# Patient Record
Sex: FEMALE | Race: WHITE | Employment: UNEMPLOYED | URBAN - METROPOLITAN AREA
[De-identification: names, ages, dates, MRNs, and addresses within clinical notes are randomized per-mention and may not be internally consistent; named-entity substitution may affect disease eponyms.]

---

## 2017-06-27 ENCOUNTER — APPOINTMENT (OUTPATIENT)
Dept: GENERAL RADIOLOGY | Age: 57
DRG: 189 | End: 2017-06-27
Attending: EMERGENCY MEDICINE
Payer: MEDICARE

## 2017-06-27 ENCOUNTER — HOSPITAL ENCOUNTER (INPATIENT)
Age: 57
LOS: 2 days | Discharge: HOME | DRG: 189 | End: 2017-06-29
Attending: EMERGENCY MEDICINE | Admitting: INTERNAL MEDICINE
Payer: MEDICARE

## 2017-06-27 DIAGNOSIS — J44.9 CHRONIC OBSTRUCTIVE PULMONARY DISEASE, UNSPECIFIED COPD TYPE (HCC): Primary | ICD-10-CM

## 2017-06-27 PROBLEM — J96.21 ACUTE ON CHRONIC RESPIRATORY FAILURE WITH HYPOXEMIA (HCC): Status: ACTIVE | Noted: 2017-06-27

## 2017-06-27 PROBLEM — E11.9 TYPE II DIABETES MELLITUS (HCC): Status: ACTIVE | Noted: 2017-06-27

## 2017-06-27 PROBLEM — N17.9 AKI (ACUTE KIDNEY INJURY) (HCC): Status: ACTIVE | Noted: 2017-06-27

## 2017-06-27 PROBLEM — I50.9 CHF (CONGESTIVE HEART FAILURE) (HCC): Status: ACTIVE | Noted: 2017-06-27

## 2017-06-27 LAB
ALBUMIN SERPL BCP-MCNC: 3.3 G/DL (ref 3.5–5)
ALBUMIN/GLOB SERPL: 0.8 {RATIO} (ref 1.1–2.2)
ALP SERPL-CCNC: 92 U/L (ref 45–117)
ALT SERPL-CCNC: 22 U/L (ref 12–78)
ANION GAP BLD CALC-SCNC: 5 MMOL/L (ref 5–15)
APTT PPP: 23.6 SEC (ref 22.1–32.5)
ARTERIAL PATENCY WRIST A: ABNORMAL
AST SERPL W P-5'-P-CCNC: 17 U/L (ref 15–37)
BASE EXCESS BLDA CALC-SCNC: 3.1 MMOL/L
BASOPHILS # BLD AUTO: 0 K/UL (ref 0–0.1)
BASOPHILS # BLD: 0 % (ref 0–1)
BDY SITE: ABNORMAL
BILIRUB SERPL-MCNC: 0.3 MG/DL (ref 0.2–1)
BNP SERPL-MCNC: 313 PG/ML (ref 0–125)
BUN SERPL-MCNC: 29 MG/DL (ref 6–20)
BUN/CREAT SERPL: 14 (ref 12–20)
CALCIUM SERPL-MCNC: 8.6 MG/DL (ref 8.5–10.1)
CHLORIDE SERPL-SCNC: 101 MMOL/L (ref 97–108)
CK SERPL-CCNC: 63 U/L (ref 26–192)
CO2 SERPL-SCNC: 33 MMOL/L (ref 21–32)
CREAT SERPL-MCNC: 2.09 MG/DL (ref 0.55–1.02)
D DIMER PPP FEU-MCNC: 0.85 MG/L FEU (ref 0–0.65)
EOSINOPHIL # BLD: 0.3 K/UL (ref 0–0.4)
EOSINOPHIL NFR BLD: 2 % (ref 0–7)
ERYTHROCYTE [DISTWIDTH] IN BLOOD BY AUTOMATED COUNT: 14.3 % (ref 11.5–14.5)
GAS FLOW.O2 O2 DELIVERY SYS: 4 L/MIN
GLOBULIN SER CALC-MCNC: 4.4 G/DL (ref 2–4)
GLUCOSE SERPL-MCNC: 184 MG/DL (ref 65–100)
HCO3 BLDA-SCNC: 29 MMOL/L (ref 22–26)
HCT VFR BLD AUTO: 31.3 % (ref 35–47)
HGB BLD-MCNC: 10.2 G/DL (ref 11.5–16)
INR PPP: 1 (ref 0.9–1.1)
LYMPHOCYTES # BLD AUTO: 18 % (ref 12–49)
LYMPHOCYTES # BLD: 2.3 K/UL (ref 0.8–3.5)
MCH RBC QN AUTO: 31.8 PG (ref 26–34)
MCHC RBC AUTO-ENTMCNC: 32.6 G/DL (ref 30–36.5)
MCV RBC AUTO: 97.5 FL (ref 80–99)
MONOCYTES # BLD: 1.1 K/UL (ref 0–1)
MONOCYTES NFR BLD AUTO: 9 % (ref 5–13)
NEUTS SEG # BLD: 9.4 K/UL (ref 1.8–8)
NEUTS SEG NFR BLD AUTO: 71 % (ref 32–75)
PCO2 BLDA: 49 MMHG (ref 35–45)
PH BLDA: 7.39 [PH] (ref 7.35–7.45)
PLATELET # BLD AUTO: 238 K/UL (ref 150–400)
PO2 BLDA: 81 MMHG (ref 80–100)
POTASSIUM SERPL-SCNC: 4 MMOL/L (ref 3.5–5.1)
PROT SERPL-MCNC: 7.7 G/DL (ref 6.4–8.2)
PROTHROMBIN TIME: 10.3 SEC (ref 9–11.1)
RBC # BLD AUTO: 3.21 M/UL (ref 3.8–5.2)
SAO2 % BLD: 96 % (ref 92–97)
SAO2% DEVICE SAO2% SENSOR NAME: ABNORMAL
SERVICE CMNT-IMP: ABNORMAL
SODIUM SERPL-SCNC: 139 MMOL/L (ref 136–145)
SPECIMEN SITE: ABNORMAL
THERAPEUTIC RANGE,PTTT: NORMAL SECS (ref 58–77)
TROPONIN I SERPL-MCNC: <0.04 NG/ML
WBC # BLD AUTO: 13.1 K/UL (ref 3.6–11)

## 2017-06-27 PROCEDURE — 85025 COMPLETE CBC W/AUTO DIFF WBC: CPT | Performed by: EMERGENCY MEDICINE

## 2017-06-27 PROCEDURE — 74011250636 HC RX REV CODE- 250/636: Performed by: EMERGENCY MEDICINE

## 2017-06-27 PROCEDURE — 94762 N-INVAS EAR/PLS OXIMTRY CONT: CPT

## 2017-06-27 PROCEDURE — 71010 XR CHEST PORT: CPT

## 2017-06-27 PROCEDURE — 80053 COMPREHEN METABOLIC PANEL: CPT | Performed by: EMERGENCY MEDICINE

## 2017-06-27 PROCEDURE — 99285 EMERGENCY DEPT VISIT HI MDM: CPT

## 2017-06-27 PROCEDURE — 82550 ASSAY OF CK (CPK): CPT | Performed by: EMERGENCY MEDICINE

## 2017-06-27 PROCEDURE — 82803 BLOOD GASES ANY COMBINATION: CPT | Performed by: EMERGENCY MEDICINE

## 2017-06-27 PROCEDURE — 65660000000 HC RM CCU STEPDOWN

## 2017-06-27 PROCEDURE — 36415 COLL VENOUS BLD VENIPUNCTURE: CPT | Performed by: EMERGENCY MEDICINE

## 2017-06-27 PROCEDURE — 77030013140 HC MSK NEB VYRM -A

## 2017-06-27 PROCEDURE — 36600 WITHDRAWAL OF ARTERIAL BLOOD: CPT | Performed by: EMERGENCY MEDICINE

## 2017-06-27 PROCEDURE — 85379 FIBRIN DEGRADATION QUANT: CPT | Performed by: EMERGENCY MEDICINE

## 2017-06-27 PROCEDURE — 96374 THER/PROPH/DIAG INJ IV PUSH: CPT

## 2017-06-27 PROCEDURE — 84484 ASSAY OF TROPONIN QUANT: CPT | Performed by: EMERGENCY MEDICINE

## 2017-06-27 PROCEDURE — 85610 PROTHROMBIN TIME: CPT | Performed by: EMERGENCY MEDICINE

## 2017-06-27 PROCEDURE — 85730 THROMBOPLASTIN TIME PARTIAL: CPT | Performed by: EMERGENCY MEDICINE

## 2017-06-27 PROCEDURE — 93005 ELECTROCARDIOGRAM TRACING: CPT

## 2017-06-27 PROCEDURE — 74011000250 HC RX REV CODE- 250: Performed by: EMERGENCY MEDICINE

## 2017-06-27 PROCEDURE — 83880 ASSAY OF NATRIURETIC PEPTIDE: CPT | Performed by: EMERGENCY MEDICINE

## 2017-06-27 RX ORDER — NALOXONE HYDROCHLORIDE 0.4 MG/ML
0.4 INJECTION, SOLUTION INTRAMUSCULAR; INTRAVENOUS; SUBCUTANEOUS AS NEEDED
Status: DISCONTINUED | OUTPATIENT
Start: 2017-06-27 | End: 2017-06-29 | Stop reason: HOSPADM

## 2017-06-27 RX ORDER — METOPROLOL TARTRATE 25 MG/1
12.5 TABLET, FILM COATED ORAL 2 TIMES DAILY
COMMUNITY

## 2017-06-27 RX ORDER — SODIUM CHLORIDE 0.9 % (FLUSH) 0.9 %
5-10 SYRINGE (ML) INJECTION EVERY 8 HOURS
Status: DISCONTINUED | OUTPATIENT
Start: 2017-06-27 | End: 2017-06-29 | Stop reason: HOSPADM

## 2017-06-27 RX ORDER — BUMETANIDE 2 MG/1
4 TABLET ORAL 2 TIMES DAILY
COMMUNITY
End: 2017-06-29

## 2017-06-27 RX ORDER — PRAMIPEXOLE DIHYDROCHLORIDE 0.5 MG/1
0.5 TABLET ORAL
COMMUNITY

## 2017-06-27 RX ORDER — HYDROCHLOROTHIAZIDE 12.5 MG/1
6.25 TABLET ORAL DAILY
COMMUNITY
End: 2017-06-27

## 2017-06-27 RX ORDER — METOPROLOL TARTRATE 50 MG/1
50 TABLET ORAL 2 TIMES DAILY
COMMUNITY
End: 2017-06-27

## 2017-06-27 RX ORDER — PANTOPRAZOLE SODIUM 40 MG/1
40 TABLET, DELAYED RELEASE ORAL
COMMUNITY

## 2017-06-27 RX ORDER — RANOLAZINE 500 MG/1
1000 TABLET, EXTENDED RELEASE ORAL 2 TIMES DAILY
COMMUNITY

## 2017-06-27 RX ORDER — METOLAZONE 2.5 MG/1
2.5 TABLET ORAL
COMMUNITY
End: 2017-06-27

## 2017-06-27 RX ORDER — CLOPIDOGREL BISULFATE 75 MG/1
75 TABLET ORAL
COMMUNITY

## 2017-06-27 RX ORDER — IPRATROPIUM BROMIDE AND ALBUTEROL SULFATE 2.5; .5 MG/3ML; MG/3ML
3 SOLUTION RESPIRATORY (INHALATION)
Status: DISCONTINUED | OUTPATIENT
Start: 2017-06-28 | End: 2017-06-28

## 2017-06-27 RX ORDER — ROPINIROLE 0.25 MG/1
0.25 TABLET, FILM COATED ORAL
COMMUNITY

## 2017-06-27 RX ORDER — IPRATROPIUM BROMIDE AND ALBUTEROL SULFATE 2.5; .5 MG/3ML; MG/3ML
3 SOLUTION RESPIRATORY (INHALATION)
Status: COMPLETED | OUTPATIENT
Start: 2017-06-27 | End: 2017-06-27

## 2017-06-27 RX ORDER — CEPHALEXIN 250 MG/1
500 CAPSULE ORAL EVERY 6 HOURS
Status: DISCONTINUED | OUTPATIENT
Start: 2017-06-28 | End: 2017-06-28

## 2017-06-27 RX ORDER — INDOMETHACIN 25 MG/1
25 CAPSULE ORAL DAILY
COMMUNITY
End: 2017-06-27

## 2017-06-27 RX ORDER — SODIUM CHLORIDE 0.9 % (FLUSH) 0.9 %
5-10 SYRINGE (ML) INJECTION AS NEEDED
Status: DISCONTINUED | OUTPATIENT
Start: 2017-06-27 | End: 2017-06-29 | Stop reason: HOSPADM

## 2017-06-27 RX ORDER — OXYBUTYNIN CHLORIDE 10 MG/1
10 TABLET, EXTENDED RELEASE ORAL
COMMUNITY

## 2017-06-27 RX ORDER — TRAMADOL HYDROCHLORIDE 50 MG/1
100 TABLET ORAL
COMMUNITY

## 2017-06-27 RX ORDER — BACLOFEN 10 MG/1
10 TABLET ORAL 2 TIMES DAILY
COMMUNITY

## 2017-06-27 RX ORDER — DULOXETIN HYDROCHLORIDE 60 MG/1
60 CAPSULE, DELAYED RELEASE ORAL
COMMUNITY

## 2017-06-27 RX ORDER — BUSPIRONE HYDROCHLORIDE 5 MG/1
5 TABLET ORAL
COMMUNITY

## 2017-06-27 RX ORDER — DOCUSATE SODIUM 100 MG/1
100 CAPSULE, LIQUID FILLED ORAL 2 TIMES DAILY
COMMUNITY

## 2017-06-27 RX ORDER — CHOLECALCIFEROL (VITAMIN D3) 125 MCG
10 CAPSULE ORAL
COMMUNITY

## 2017-06-27 RX ORDER — MELATONIN
1000 DAILY
COMMUNITY

## 2017-06-27 RX ORDER — PRAVASTATIN SODIUM 40 MG/1
40 TABLET ORAL
COMMUNITY

## 2017-06-27 RX ORDER — GABAPENTIN 300 MG/1
600 CAPSULE ORAL
COMMUNITY

## 2017-06-27 RX ORDER — LANOLIN ALCOHOL/MO/W.PET/CERES
325 CREAM (GRAM) TOPICAL
COMMUNITY

## 2017-06-27 RX ORDER — POTASSIUM CHLORIDE 20 MEQ/1
20 TABLET, EXTENDED RELEASE ORAL DAILY
COMMUNITY

## 2017-06-27 RX ORDER — SPIRONOLACTONE 50 MG/1
50 TABLET, FILM COATED ORAL
COMMUNITY

## 2017-06-27 RX ADMIN — METHYLPREDNISOLONE SODIUM SUCCINATE 125 MG: 125 INJECTION, POWDER, FOR SOLUTION INTRAMUSCULAR; INTRAVENOUS at 22:00

## 2017-06-27 RX ADMIN — IPRATROPIUM BROMIDE AND ALBUTEROL SULFATE 3 ML: .5; 3 SOLUTION RESPIRATORY (INHALATION) at 02:20

## 2017-06-27 NOTE — IP AVS SNAPSHOT
Current Discharge Medication List  
  
START taking these medications Dose & Instructions Dispensing Information Comments Morning Noon Evening Bedtime  
 cefdinir 300 mg capsule Commonly known as:  OMNICEF Your last dose was: Your next dose is:    
   
   
 Dose:  300 mg Take 1 Cap by mouth two (2) times a day for 5 days. Quantity:  10 Cap Refills:  0  
     
   
   
   
  
 doxycycline 100 mg tablet Commonly known as:  VIBRA-TABS Your last dose was: Your next dose is:    
   
   
 Dose:  100 mg Take 1 Tab by mouth every twelve (12) hours for 5 days. Quantity:  10 Tab Refills:  0  
     
   
   
   
  
 L. acidoph & paracasei- S therm- Bifido 8 billion cell Cap cap Commonly known as:  ROE-Q/RISAQUAD Your last dose was: Your next dose is:    
   
   
 Dose:  1 Cap Take 1 Cap by mouth daily. Quantity:  30 Cap Refills:  1  
     
   
   
   
  
 predniSONE 10 mg tablet Commonly known as:  Zolumu Maher Your last dose was: Your next dose is: Take 40mg (4 tabs) daily for 2 days, then 20mg (2 tabs) daily for 2 days, then 10mg daily until you see your pulmonologist at [facility] Quantity:  30 Tab Refills:  0 CONTINUE these medications which have CHANGED Dose & Instructions Dispensing Information Comments Morning Noon Evening Bedtime  
 bumetanide 2 mg tablet Commonly known as:  [facility] What changed:  how much to take Your last dose was: Your next dose is:    
   
   
 Dose:  2 mg Take 1 Tab by mouth two (2) times a day. Quantity:  60 Tab Refills:  0 CONTINUE these medications which have NOT CHANGED Dose & Instructions Dispensing Information Comments Morning Noon Evening Bedtime  
 baclofen 10 mg tablet Commonly known as:  LIORESAL Your last dose was:     
   
Your next dose is:    
   
   
 Dose:  10 mg  
 Take 10 mg by mouth two (2) times a day. Refills:  0  
     
   
   
   
  
 busPIRone 5 mg tablet Commonly known as:  BUSPAR Your last dose was: Your next dose is:    
   
   
 Dose:  5 mg Take 5 mg by mouth three (3) times daily as needed. Refills:  0  
     
   
   
   
  
 calcium carbonate-vitamin d2 500 mg(1,250mg) -200 unit Tab Your last dose was: Your next dose is:    
   
   
 Dose:  1 Tab Take 1 Tab by mouth two (2) times a day. Refills:  0  
     
   
   
   
  
 clopidogrel 75 mg Tab Commonly known as:  PLAVIX Your last dose was: Your next dose is:    
   
   
 Dose:  75 mg Take 75 mg by mouth nightly. Refills:  0  
     
   
   
   
  
 docusate sodium 100 mg capsule Commonly known as:  Frederick Bucker Your last dose was: Your next dose is:    
   
   
 Dose:  100 mg Take 100 mg by mouth two (2) times a day. Refills:  0 DULoxetine 60 mg capsule Commonly known as:  CYMBALTA Your last dose was: Your next dose is:    
   
   
 Dose:  60 mg Take 60 mg by mouth nightly. Refills:  0  
     
   
   
   
  
 ferrous sulfate 325 mg (65 mg iron) tablet Your last dose was: Your next dose is:    
   
   
 Dose:  325 mg Take 325 mg by mouth Daily (before breakfast). Refills:  0  
     
   
   
   
  
 gabapentin 300 mg capsule Commonly known as:  NEURONTIN Your last dose was: Your next dose is:    
   
   
 Dose:  600 mg Take 600 mg by mouth nightly. Refills:  0  
     
   
   
   
  
 melatonin Tab tablet Your last dose was: Your next dose is:    
   
   
 Dose:  10 mg Take 10 mg by mouth nightly. Refills:  0  
     
   
   
   
  
 metoprolol tartrate 25 mg tablet Commonly known as:  LOPRESSOR Your last dose was: Your next dose is:    
   
   
 Dose:  12.5 mg Take 12.5 mg by mouth two (2) times a day. Refills:  0 oxybutynin chloride XL 10 mg CR tablet Commonly known as:  DITROPAN XL Your last dose was: Your next dose is:    
   
   
 Dose:  10 mg Take 10 mg by mouth nightly. Refills:  0  
     
   
   
   
  
 pantoprazole 40 mg tablet Commonly known as:  PROTONIX Your last dose was: Your next dose is:    
   
   
 Dose:  40 mg Take 40 mg by mouth nightly. Refills:  0  
     
   
   
   
  
 potassium chloride 20 mEq tablet Commonly known as:  K-DUR, KLOR-CON Your last dose was: Your next dose is:    
   
   
 Dose:  20 mEq Take 20 mEq by mouth daily. Refills:  0  
     
   
   
   
  
 pramipexole 0.5 mg tablet Commonly known as:  MIRAPEX Your last dose was: Your next dose is:    
   
   
 Dose:  0.5 mg Take 0.5 mg by mouth nightly. Refills:  0  
     
   
   
   
  
 pravastatin 40 mg tablet Commonly known as:  PRAVACHOL Your last dose was: Your next dose is:    
   
   
 Dose:  40 mg Take 40 mg by mouth nightly. Refills:  0  
     
   
   
   
  
 RANEXA 500 mg SR tablet Generic drug:  ranolazine ER Your last dose was: Your next dose is:    
   
   
 Dose:  1000 mg Take 1,000 mg by mouth two (2) times a day. Refills:  0  
     
   
   
   
  
 * rOPINIRole 0.25 mg tablet Commonly known as:  Marilia Otero Your last dose was: Your next dose is:    
   
   
 Dose:  0.25 mg Take 0.25 mg by mouth daily as needed. In addition to one tablet nightly Refills:  0  
     
   
   
   
  
 * rOPINIRole 0.25 mg tablet Commonly known as:  Marilia  Your last dose was: Your next dose is:    
   
   
 Dose:  0.25 mg Take 0.25 mg by mouth nightly. Refills:  0  
     
   
   
   
  
 spironolactone 50 mg tablet Commonly known as:  ALDACTONE Your last dose was:     
   
Your next dose is:    
   
   
 Dose:  50 mg  
 Take 50 mg by mouth every Monday, Wednesday, Friday. Refills:  0  
     
   
   
   
  
 traMADol 50 mg tablet Commonly known as:  ULTRAM  
   
Your last dose was: Your next dose is:    
   
   
 Dose:  100 mg Take 100 mg by mouth nightly. Refills:  0  
     
   
   
   
  
 VITAMIN D3 1,000 unit tablet Generic drug:  cholecalciferol Your last dose was: Your next dose is:    
   
   
 Dose:  1000 Units Take 1,000 Units by mouth daily. Refills:  0  
     
   
   
   
  
 * Notice: This list has 2 medication(s) that are the same as other medications prescribed for you. Read the directions carefully, and ask your doctor or other care provider to review them with you. Where to Get Your Medications Information on where to get these meds will be given to you by the nurse or doctor. ! Ask your nurse or doctor about these medications  
  bumetanide 2 mg tablet  
 cefdinir 300 mg capsule  
 doxycycline 100 mg tablet L. acidoph & paracasei- S therm- Bifido 8 billion cell Cap cap  
 predniSONE 10 mg tablet

## 2017-06-27 NOTE — IP AVS SNAPSHOT
Beatrice Michaela 
 
 
 [address] [id number] Patient: Yen Andino MRN: [id number] You are allergic to the following Allergen Reactions Macrodantin (Nitrofurantoin Macrocrystal) Anaphylaxis Cefzil (Cefprozil) Rash Ciprofloxacin Shortness of Breath Codeine Rash Cortisone Rash Endocet (Oxycodone-Acetaminophen) Rash Hydrocodone Rash Ibuprofen Other (comments) Cellulitis Influenza Virus Vaccines Other (comments) Pyrexia Lipitor (Atorvastatin) Other (comments) Blister Monurol (Fosfomycin Tromethamine) Nausea and Vomiting Penicillins Shortness of Breath Pneumococcal Vaccine Other (comments) Pyrexia Sulfa (Sulfonamide Antibiotics) Hives Topiramate Other (comments) Slurred speech Torsemide Shortness of Breath Adhesive Tape-Silicones Rash Tapentadol Rash Not tapentadol. ...tape Recent Documentation Height Weight Breastfeeding? BMI OB Status Smoking Status 1.6 m 131.6 kg No 51.39 kg/m2 Hysterectomy Current Every Day Smoker Unresulted Labs Order Current Status CULTURE, URINE Preliminary result Emergency Contacts Name Discharge Info Relation Home Work [id number]   Other Relative [6]   [phone] [facility]  Mother [14] [phone] Haylie Davidson DISCHARGE CAREGIVER [3] Other Relative [6] [phone] About your hospitalization You were admitted on:  June 27, 2017 You last received care in the:  [facility]  MED SURG 2 You were discharged on:  June 29, 2017 Unit phone number:  [phone] Why you were hospitalized Your primary diagnosis was:  Acute On Chronic Respiratory Failure With Hypoxemia (Hcc)  Your diagnoses also included:  Chf (Congestive Heart Failure) (Hcc), Dereje (Acute Kidney Injury) (Hcc), Type Ii Diabetes Mellitus (Hcc), Chronic Obstructive Pulmonary Disease (Hcc), Neftali Treated With Bipap, Morbid Obesity With Bmi Of 50.0-59.9, Adult (Hcc), Cellulitis, Uti (Urinary Tract Infection) Providers Seen During Your Hospitalizations Provider Role Specialty Primary office phone Xiomara Aguillon MD Attending Provider Emergency Medicine [phone] Marlee Romeo MD Attending Provider Internal Medicine [phone] Aileen Villalobos MD Attending Provider Internal Medicine [phone] Your Primary Care Physician (PCP) Primary Care Physician Office Phone Office Fax Nitish Cassette [phone] Follow-up Information Follow up With Details Comments Contact Info Adrien Villatoro MD Schedule an appointment as soon as possible for a visit on 6/30/2017 8:00 AM for Transition of Care [phone] 
[phone] Sampson Blas MD Schedule an appointment as soon as possible for a visit in 1 week  [address] [place] [address] 
[phone] Current Discharge Medication List  
  
START taking these medications Dose & Instructions Dispensing Information Comments Morning Noon Evening Bedtime  
 cefdinir 300 mg capsule Commonly known as:  OMNICEF Your last dose was: Your next dose is:    
   
   
 Dose:  300 mg Take 1 Cap by mouth two (2) times a day for 5 days. Quantity:  10 Cap Refills:  0  
     
   
   
   
  
 doxycycline 100 mg tablet Commonly known as:  VIBRA-TABS Your last dose was: Your next dose is:    
   
   
 Dose:  100 mg Take 1 Tab by mouth every twelve (12) hours for 5 days. Quantity:  10 Tab Refills:  0  
     
   
   
   
  
 L. acidoph & paracasei- S therm- Bifido 8 billion cell Cap cap Commonly known as:  ROE-Q/RISAQUAD Your last dose was: Your next dose is:    
   
   
 Dose:  1 Cap Take 1 Cap by mouth daily. Quantity:  30 Cap Refills:  1  
     
   
   
   
  
 predniSONE 10 mg tablet Commonly known as:  Valmaritza Benjamin Your last dose was: Your next dose is: Take 40mg (4 tabs) daily for 2 days, then 20mg (2 tabs) daily for 2 days, then 10mg daily until you see your pulmonologist at [facility] Quantity:  30 Tab Refills:  0 CONTINUE these medications which have CHANGED Dose & Instructions Dispensing Information Comments Morning Noon Evening Bedtime  
 bumetanide 2 mg tablet Commonly known as:  Vladimir Mora What changed:  how much to take Your last dose was: Your next dose is:    
   
   
 Dose:  2 mg Take 1 Tab by mouth two (2) times a day. Quantity:  60 Tab Refills:  0 CONTINUE these medications which have NOT CHANGED Dose & Instructions Dispensing Information Comments Morning Noon Evening Bedtime  
 baclofen 10 mg tablet Commonly known as:  LIORESAL Your last dose was: Your next dose is:    
   
   
 Dose:  10 mg Take 10 mg by mouth two (2) times a day. Refills:  0  
     
   
   
   
  
 busPIRone 5 mg tablet Commonly known as:  BUSPAR Your last dose was: Your next dose is:    
   
   
 Dose:  5 mg Take 5 mg by mouth three (3) times daily as needed. Refills:  0  
     
   
   
   
  
 calcium carbonate-vitamin d2 500 mg(1,250mg) -200 unit Tab Your last dose was: Your next dose is:    
   
   
 Dose:  1 Tab Take 1 Tab by mouth two (2) times a day. Refills:  0  
     
   
   
   
  
 clopidogrel 75 mg Tab Commonly known as:  PLAVIX Your last dose was: Your next dose is:    
   
   
 Dose:  75 mg Take 75 mg by mouth nightly. Refills:  0  
     
   
   
   
  
 docusate sodium 100 mg capsule Commonly known as:  Mounika North Your last dose was:     
   
Your next dose is:    
   
   
 Dose:  100 mg  
 Take 100 mg by mouth two (2) times a day. Refills:  0 DULoxetine 60 mg capsule Commonly known as:  CYMBALTA Your last dose was: Your next dose is:    
   
   
 Dose:  60 mg Take 60 mg by mouth nightly. Refills:  0  
     
   
   
   
  
 ferrous sulfate 325 mg (65 mg iron) tablet Your last dose was: Your next dose is:    
   
   
 Dose:  325 mg Take 325 mg by mouth Daily (before breakfast). Refills:  0  
     
   
   
   
  
 gabapentin 300 mg capsule Commonly known as:  NEURONTIN Your last dose was: Your next dose is:    
   
   
 Dose:  600 mg Take 600 mg by mouth nightly. Refills:  0  
     
   
   
   
  
 melatonin Tab tablet Your last dose was: Your next dose is:    
   
   
 Dose:  10 mg Take 10 mg by mouth nightly. Refills:  0  
     
   
   
   
  
 metoprolol tartrate 25 mg tablet Commonly known as:  LOPRESSOR Your last dose was: Your next dose is:    
   
   
 Dose:  12.5 mg Take 12.5 mg by mouth two (2) times a day. Refills:  0  
     
   
   
   
  
 oxybutynin chloride XL 10 mg CR tablet Commonly known as:  DITROPAN XL Your last dose was: Your next dose is:    
   
   
 Dose:  10 mg Take 10 mg by mouth nightly. Refills:  0  
     
   
   
   
  
 pantoprazole 40 mg tablet Commonly known as:  PROTONIX Your last dose was: Your next dose is:    
   
   
 Dose:  40 mg Take 40 mg by mouth nightly. Refills:  0  
     
   
   
   
  
 potassium chloride 20 mEq tablet Commonly known as:  K-DUR, KLOR-CON Your last dose was: Your next dose is:    
   
   
 Dose:  20 mEq Take 20 mEq by mouth daily. Refills:  0  
     
   
   
   
  
 pramipexole 0.5 mg tablet Commonly known as:  MIRAPEX Your last dose was: Your next dose is:    
   
   
 Dose:  0.5 mg Take 0.5 mg by mouth nightly. Refills:  0 pravastatin 40 mg tablet Commonly known as:  PRAVACHOL Your last dose was: Your next dose is:    
   
   
 Dose:  40 mg Take 40 mg by mouth nightly. Refills:  0  
     
   
   
   
  
 RANEXA 500 mg SR tablet Generic drug:  ranolazine ER Your last dose was: Your next dose is:    
   
   
 Dose:  1000 mg Take 1,000 mg by mouth two (2) times a day. Refills:  0  
     
   
   
   
  
 * rOPINIRole 0.25 mg tablet Commonly known as:  Anthony Joseph Your last dose was: Your next dose is:    
   
   
 Dose:  0.25 mg Take 0.25 mg by mouth daily as needed. In addition to one tablet nightly Refills:  0  
     
   
   
   
  
 * rOPINIRole 0.25 mg tablet Commonly known as:  [place] Joseph Your last dose was: Your next dose is:    
   
   
 Dose:  0.25 mg Take 0.25 mg by mouth nightly. Refills:  0  
     
   
   
   
  
 spironolactone 50 mg tablet Commonly known as:  ALDACTONE Your last dose was: Your next dose is:    
   
   
 Dose:  50 mg Take 50 mg by mouth every Monday, Wednesday, Friday. Refills:  0  
     
   
   
   
  
 traMADol 50 mg tablet Commonly known as:  ULTRAM  
   
Your last dose was: Your next dose is:    
   
   
 Dose:  100 mg Take 100 mg by mouth nightly. Refills:  0  
     
   
   
   
  
 VITAMIN D3 1,000 unit tablet Generic drug:  cholecalciferol Your last dose was: Your next dose is:    
   
   
 Dose:  1000 Units Take 1,000 Units by mouth daily. Refills:  0  
     
   
   
   
  
 * Notice: This list has 2 medication(s) that are the same as other medications prescribed for you. Read the directions carefully, and ask your doctor or other care provider to review them with you. Where to Get Your Medications Information on where to get these meds will be given to you by the nurse or doctor. ! Ask your nurse or doctor about these medications bumetanide 2 mg tablet  
 cefdinir 300 mg capsule  
 doxycycline 100 mg tablet L. acidoph & paracasei- S therm- Bifido 8 billion cell Cap cap  
 predniSONE 10 mg tablet Discharge Instructions HOSPITALIST DISCHARGE INSTRUCTIONS 
NAME: Yogesh Rodriguez :  1960 MRN:  [id number] Date/Time:  2017 11:36 AM 
 
ADMIT DATE: 2017 DISCHARGE DATE: 2017 ADMITTING DIAGNOSIS: 
COPD, acute kidney injury, UTI DISCHARGE DIAGNOSIS: 
same MEDICATIONS: 
See after visit summary · It is important that you take the medication exactly as they are prescribed. · Keep your medication in the bottles provided by the pharmacist and keep a list of the medication names, dosages, and times to be taken in your wallet. · Do not take other medications without consulting your doctor Pain Management: per above medications What to do at [facility] Recommended diet:  Diabetic Diet Recommended activity: Activity as tolerated 1) Return to the hospital if you feel worse 2) If you experience any of the following symptoms then please call your primary care physician or return to the emergency room if you cannot get hold of your doctor: 
Fever, chills, nausea, vomiting, diarrhea, change in mentation, falling, bleeding, shortness of breath, chest pain, severe headache, severe abdominal pain, 3) Please decrease your bumex dose as instructed 4) Take antibiotics for urine infection 5) Take prednisone as prescribed. Follow up with your lung doctors 6) Resume your home oxygen Follow Up: Follow-up Information Follow up With Details Comments Contact Info Nhan Villalba MD Schedule an appointment as soon as possible for a visit in 1 week  [facility] [phone] Jayson Fenton MD Schedule an appointment as soon as possible for a visit in 1 week  [address] [facility] 
[phone] Information obtained by : 
I understand that if any problems occur once I am at home I am to contact my physician. I understand and acknowledge receipt of the instructions indicated above. Physician's or R.N.'s Signature                                                                  Date/Time Patient or Representative Signature                                                          Date/Time Chronic Obstructive Pulmonary Disease (COPD) Flare-Ups: Care Instructions Your Care Instructions Chronic obstructive pulmonary disease (COPD) is a lung disease that makes it hard to breathe. It is caused by damage to the lungs over many years, usually from smoking. COPD is often a mix of two diseases: · Chronic bronchitis: The airways that carry air to the lungs (bronchial tubes) get inflamed and make a lot of mucus. This can narrow or block the airways. · Emphysema: In a healthy person, the tiny air sacs in the lungs are like balloons. As you breathe in and out, they get bigger and smaller to move air through your lungs. But with emphysema, these air sacs are damaged and lose their stretch. Less air gets in and out of the lungs. Many people with COPD have attacks called flare-ups or exacerbations. This is when your usual symptoms quickly get worse and stay worse. The doctor has checked you carefully. But problems can develop later. If you notice any problems or new symptoms, get medical treatment right away. Follow-up care is a key part of your treatment and safety. Be sure to make and go to all appointments, and call your doctor if you are having problems.  It's also a good idea to know your test results and keep a list of the medicines you take. How can you care for yourself at home? · Be safe with medicines. Take your medicines exactly as prescribed. Call your doctor if you think you are having a problem with your medicine. You may be taking medicines such as: ¨ Bronchodilators. These help open your airways and make breathing easier. ¨ Corticosteroids. These reduce airway inflammation. They may be given as pills, in a vein, or in an inhaled form. You may go home with pills in addition to an inhaler that you already use. · A spacer may help you get more inhaled medicine to your lungs. Ask your doctor or pharmacist if a spacer is right for you. If it is, ask how to use it properly. · If your doctor prescribed antibiotics, take them as directed. Do not stop taking them just because you feel better. You need to take the full course of antibiotics. · If your doctor prescribed oxygen, use the flow rate your doctor has recommended. Do not change it without talking to your doctor first. 
· Do not smoke. Smoking makes COPD worse. If you need help quitting, talk to your doctor about stop-smoking programs and medicines. These can increase your chances of quitting for good. When should you call for help? Call 911 anytime you think you may need emergency care. For example, call if: 
· You have severe trouble breathing. Call your doctor now or seek immediate medical care if: 
· You have new or worse trouble breathing. · Your coughing or wheezing gets worse. · You cough up dark brown or bloody mucus (sputum). · You have a new or higher fever. Watch closely for changes in your health, and be sure to contact your doctor if: 
· You notice more mucus or a change in the color of your mucus. · You need to use your antibiotic or steroid pills. · You do not get better as expected. Where can you learn more? Go to http://breanna-katy.info/.  
Enter Q899 in the search box to learn more about \"Chronic Obstructive Pulmonary Disease (COPD) Flare-Ups: Care Instructions. \" Current as of: March 25, 2017 Content Version: 11.3 © 7083-4120 [employer]ault. Care instructions adapted under license by [employer] (which disclaims liability or warranty for this information). If you have questions about a medical condition or this instruction, always ask your healthcare professional. [address] any warranty or liability for your use of this information. Stopping Smoking: Care Instructions Your Care Instructions Cigarette smokers crave the nicotine in cigarettes. Giving it up is much harder than simply changing a habit. Your body has to stop craving the nicotine. It is hard to quit, but you can do it. There are many tools that people use to quit smoking. You may find that combining tools works best for you. There are several steps to quitting. First you get ready to quit. Then you get support to help you. After that, you learn new skills and behaviors to become a nonsmoker. For many people, a necessary step is getting and using medicine. Your doctor will help you set up the plan that best meets your needs. You may want to attend a smoking cessation program to help you quit smoking. When you choose a program, look for one that has proven success. Ask your doctor for ideas. You will greatly increase your chances of success if you take medicine as well as get counseling or join a cessation program. 
Some of the changes you feel when you first quit tobacco are uncomfortable. Your body will miss the nicotine at first, and you may feel short-tempered and grumpy. You may have trouble sleeping or concentrating. Medicine can help you deal with these symptoms. You may struggle with changing your smoking habits and rituals. The last step is the tricky one: Be prepared for the smoking urge to continue for a time. This is a lot to deal with, but keep at it. You will feel better. Follow-up care is a key part of your treatment and safety. Be sure to make and go to all appointments, and call your doctor if you are having problems. Its also a good idea to know your test results and keep a list of the medicines you take. How can you care for yourself at home? · Ask your family, friends, and coworkers for support. You have a better chance of quitting if you have help and support. · Join a support group, such as Nicotine Anonymous, for people who are trying to quit smoking. · Consider signing up for a smoking cessation program, such as the American Lung Association's Freedom from Smoking program. 
· Set a quit date. Pick your date carefully so that it is not right in the middle of a big deadline or stressful time. Once you quit, do not even take a puff. Get rid of all ashtrays and lighters after your last cigarette. Clean your house and your clothes so that they do not smell of smoke. · Learn how to be a nonsmoker. Think about ways you can avoid those things that make you reach for a cigarette. ¨ Avoid situations that put you at greatest risk for smoking. For some people, it is hard to have a drink with friends without smoking. For others, they might skip a coffee break with coworkers who smoke. ¨ Change your daily routine. Take a different route to work or eat a meal in a different place. · Cut down on stress. Calm yourself or release tension by doing an activity you enjoy, such as reading a book, taking a hot bath, or gardening. · Talk to your doctor or pharmacist about nicotine replacement therapy, which replaces the nicotine in your body. You still get nicotine but you do not use tobacco. Nicotine replacement products help you slowly reduce the amount of nicotine you need. These products come in several forms, many of them available over-the-counter: ¨ Nicotine patches ¨ Nicotine gum and lozenges ¨ Nicotine inhaler · Ask your doctor about bupropion (Wellbutrin) or varenicline (Chantix), which are prescription medicines. They do not contain nicotine. They help you by reducing withdrawal symptoms, such as stress and anxiety. · Some people find hypnosis, acupuncture, and massage helpful for ending the smoking habit. · Eat a healthy diet and get regular exercise. Having healthy habits will help your body move past its craving for nicotine. · Be prepared to keep trying. Most people are not successful the first few times they try to quit. Do not get mad at yourself if you smoke again. Make a list of things you learned and think about when you want to try again, such as next week, next month, or next year. Where can you learn more? Go to http://breanna5 Star Mobilekaty.info/. Enter R348 in the search box to learn more about \"Stopping Smoking: Care Instructions. \" Current as of: March 20, 2017 Content Version: 11.3 © 9321-5074 Notifixious. Care instructions adapted under license by [employer] (which disclaims liability or warranty for this information). If you have questions about a medical condition or this instruction, always ask your healthcare professional. [address] [address] any warranty or liability for your use of this information. Urinary Tract Infection in Women: Care Instructions Your Care Instructions A urinary tract infection, or UTI, is a general term for an infection anywhere between the kidneys and the urethra (where urine comes out). Most UTIs are bladder infections. They often cause pain or burning when you urinate. UTIs are caused by bacteria and can be cured with antibiotics. Be sure to complete your treatment so that the infection goes away. Follow-up care is a key part of your treatment and safety.  Be sure to make and go to all appointments, and call your doctor if you are having problems. It's also a good idea to know your test results and keep a list of the medicines you take. How can you care for yourself at home? · Take your antibiotics as directed. Do not stop taking them just because you feel better. You need to take the full course of antibiotics. · Drink extra water and other fluids for the next day or two. This may help wash out the bacteria that are causing the infection. (If you have kidney, heart, or liver disease and have to limit fluids, talk with your doctor before you increase your fluid intake.) · Avoid drinks that are carbonated or have caffeine. They can irritate the bladder. · Urinate often. Try to empty your bladder each time. · To relieve pain, take a hot bath or lay a heating pad set on low over your lower belly or genital area. Never go to sleep with a heating pad in place. To prevent UTIs · Drink plenty of water each day. This helps you urinate often, which clears bacteria from your system. (If you have kidney, heart, or liver disease and have to limit fluids, talk with your doctor before you increase your fluid intake.) · Urinate when you need to. · Urinate right after you have sex. · Change sanitary pads often. · Avoid douches, bubble baths, feminine hygiene sprays, and other feminine hygiene products that have deodorants. · After going to the bathroom, wipe from front to back. When should you call for help? Call your doctor now or seek immediate medical care if: · Symptoms such as fever, chills, nausea, or vomiting get worse or appear for the first time. · You have new pain in your back just below your rib cage. This is called flank pain. · There is new blood or pus in your urine. · You have any problems with your antibiotic medicine. Watch closely for changes in your health, and be sure to contact your doctor if: 
· You are not getting better after taking an antibiotic for 2 days. · Your symptoms go away but then come back. Where can you learn more? Go to http://breanna-katy.info/. Enter A514 in the search box to learn more about \"Urinary Tract Infection in Women: Care Instructions. \" Current as of: November 28, 2016 Content Version: 11.3 © 1852-4692 DataGravity. Care instructions adapted under license by [employer] (which disclaims liability or warranty for this information). If you have questions about a medical condition or this instruction, always ask your healthcare professional. [address] any warranty or liability for your use of this information. Discharge Instructions Attachments/References HEART FAILURE: AVOIDING TRIGGERS (ENGLISH) Discharge Orders None [email or web address] Announcement We are excited to announce that we are making your provider's discharge notes available to you in [email or web address]. You will see these notes when they are completed and signed by the physician that discharged you from your recent hospital stay. If you have any questions or concerns about any information you see in [email or web address], please call the Health Information Department where you were seen or reach out to your Primary Care Provider for more information about your plan of care. Introducing [facility]! [facility] introduces [email or web address] patient portal. Now you can access parts of your medical record, email your doctor's office, and request medication refills online. 1. In your internet browser, go to [email or web address] 2. Click on the First Time User? Click Here link in the Sign In box. You will see the New Member Sign Up page. 3. Enter your [email or web address] Access Code exactly as it appears below. You will not need to use this code after youve completed the sign-up process. If you do not sign up before the expiration date, you must request a new code. · [email or web address] Access Code: [email or web address] Expires: 9/27/2017 11:45 AM 
 
 4. Enter the last four digits of your Social Security Number (xxxx) and Date of Birth (mm/dd/yyyy) as indicated and click Submit. You will be taken to the next sign-up page. 5. Create a Conmio ID. This will be your Conmio login ID and cannot be changed, so think of one that is secure and easy to remember. 6. Create a Conmio password. You can change your password at any time. 7. Enter your Password Reset Question and Answer. This can be used at a later time if you forget your password. 8. Enter your e-mail address. You will receive e-mail notification when new information is available in [address] 9. Click Sign Up. You can now view and download portions of your medical record. 10. Click the Download Summary menu link to download a portable copy of your medical information. If you have questions, please visit the Frequently Asked Questions section of the [employer] website. Remember, [facility] is NOT to be used for urgent needs. For medical emergencies, dial 911. Now available from your iPhone and Android! General Information Please provide this summary of care documentation to your next provider. Patient Signature:  ____________________________________________________________ Date:  ____________________________________________________________  
  
Annmarie Merida Provider Signature:  ____________________________________________________________ Date:  ____________________________________________________________ More Information Avoiding Triggers With Heart Failure: Care Instructions Your Care Instructions Triggers are anything that make your heart failure flare up. A flare-up is also called \"sudden heart failure\" or \"acute heart failure. \" When you have a flare-up, fluid builds up in your lungs, and you have problems breathing.  You might need to go to the hospital. By watching for changes in your condition and avoiding triggers, you can prevent heart failure flare-ups. Follow-up care is a key part of your treatment and safety. Be sure to make and go to all appointments, and call your doctor if you are having problems. It's also a good idea to know your test results and keep a list of the medicines you take. How can you care for yourself at home? Watch for changes in your weight and condition · Weigh yourself without clothing at the same time each day. Record your weight. Call your doctor if you have sudden weight gain, such as more than 2 to 3 pounds in a day or 5 pounds in a week. (Your doctor may suggest a different range of weight gain.) A sudden weight gain may mean that your heart failure is getting worse. · Keep a daily record of your symptoms. Write down any changes in how you feel, such as new shortness of breath, cough, or problems eating. Also record if your ankles are more swollen than usual and if you feel more tired than usual. Note anything that you ate or did that could have triggered these changes. Limit sodium Sodium causes your body to hold on to extra water. This may cause your heart failure symptoms to get worse. People get most of their sodium from processed foods. Fast food and restaurant meals also tend to be very high in sodium. · Your doctor may suggest that you limit sodium to 2,000 milligrams (mg) a day or less. That is less than 1 teaspoon of salt a day, including all the salt you eat in cooking or in packaged foods. · Read food labels on cans and food packages. They tell you how much sodium you get in one serving. Check the serving size. If you eat more than one serving, you are getting more sodium. · Be aware that sodium can come in forms other than salt, including monosodium glutamate (MSG), sodium citrate, and sodium bicarbonate (baking soda). MSG is often added to Asian food. You can sometimes ask for food without MSG or salt. · Slowly reducing salt will help you adjust to the taste. Take the salt shaker off the table. · Flavor your food with garlic, lemon juice, onion, vinegar, herbs, and spices instead of salt. Do not use soy sauce, steak sauce, onion salt, garlic salt, mustard, or ketchup on your food, unless it is labeled \"low-sodium\" or \"low-salt. \" 
· Make your own salad dressings, sauces, and ketchup without adding salt. · Use fresh or frozen ingredients, instead of canned ones, whenever you can. Choose low-sodium canned goods. · Eat less processed food and food from restaurants, including fast food. Exercise as directed Moderate, regular exercise is very good for your heart. It improves your blood flow and helps control your weight. But too much exercise can stress your heart and cause a heart failure flare-up. · Check with your doctor before you start an exercise program. 
· Walking is an easy way to get exercise. Start out slowly. Gradually increase the length and pace of your walk. Swimming, riding a bike, and using a treadmill are also good forms of exercise. · When you exercise, watch for signs that your heart is working too hard. You are pushing yourself too hard if you cannot talk while you are exercising. If you become short of breath or dizzy or have chest pain, stop, sit down, and rest. 
· Do not exercise when you do not feel well. Take medicines correctly · Take your medicines exactly as prescribed. Call your doctor if you think you are having a problem with your medicine. · Make a list of all the medicines you take. Include those prescribed to you by other doctors and any over-the-counter medicines, vitamins, or supplements you take. Take this list with you when you go to any doctor. · Take your medicines at the same time every day. It may help you to post a list of all the medicines you take every day and what time of day you take them. · Make taking your medicine as simple as you can. Plan times to take your medicines when you are doing other things, such as eating a meal or getting ready for bed. This will make it easier to remember to take your medicines. · Get organized. Use helpful tools, such as daily or weekly pill containers. When should you call for help? Call 911 if you have symptoms of sudden heart failure such as: 
· You have severe trouble breathing. · You cough up pink, foamy mucus. · You have a new irregular or rapid heartbeat. Call your doctor now or seek immediate medical care if: 
· You have new or increased shortness of breath. · You are dizzy or lightheaded, or you feel like you may faint. · You have sudden weight gain, such as more than 2 to 3 pounds in a day or 5 pounds in a week. (Your doctor may suggest a different range of weight gain.) · You have increased swelling in your legs, ankles, or feet. · You are suddenly so tired or weak that you cannot do your usual activities. Watch closely for changes in your health, and be sure to contact your doctor if you develop new symptoms. Where can you learn more? Go to http://breanna-katy.info/. Enter F236 in the search box to learn more about \"Avoiding Triggers With Heart Failure: Care Instructions. \" Current as of: February 23, 2017 Content Version: 11.3 © 5217-4737 [employer]. Care instructions adapted under license by [employer] (which disclaims liability or warranty for this information). If you have questions about a medical condition or this instruction, always ask your healthcare professional. [place] [address] any warranty or liability for your use of this information.

## 2017-06-28 PROBLEM — J44.9 CHRONIC OBSTRUCTIVE PULMONARY DISEASE (HCC): Status: ACTIVE | Noted: 2017-06-28

## 2017-06-28 PROBLEM — E66.01 MORBID OBESITY WITH BMI OF 50.0-59.9, ADULT (HCC): Status: ACTIVE | Noted: 2017-06-28

## 2017-06-28 PROBLEM — G47.33 OSA TREATED WITH BIPAP: Status: ACTIVE | Noted: 2017-06-28

## 2017-06-28 PROBLEM — L03.90 CELLULITIS: Status: ACTIVE | Noted: 2017-06-28

## 2017-06-28 LAB
ALBUMIN SERPL BCP-MCNC: 3 G/DL (ref 3.5–5)
ALBUMIN/GLOB SERPL: 0.7 {RATIO} (ref 1.1–2.2)
ALP SERPL-CCNC: 75 U/L (ref 45–117)
ALT SERPL-CCNC: 21 U/L (ref 12–78)
ANION GAP BLD CALC-SCNC: 6 MMOL/L (ref 5–15)
APPEARANCE UR: ABNORMAL
AST SERPL W P-5'-P-CCNC: 15 U/L (ref 15–37)
ATRIAL RATE: 93 BPM
BACTERIA URNS QL MICRO: ABNORMAL /HPF
BASOPHILS # BLD AUTO: 0 K/UL (ref 0–0.1)
BASOPHILS # BLD: 0 % (ref 0–1)
BILIRUB SERPL-MCNC: 0.3 MG/DL (ref 0.2–1)
BILIRUB UR QL: NEGATIVE
BUN SERPL-MCNC: 28 MG/DL (ref 6–20)
BUN/CREAT SERPL: 15 (ref 12–20)
CALCIUM SERPL-MCNC: 8.6 MG/DL (ref 8.5–10.1)
CALCULATED P AXIS, ECG09: 81 DEGREES
CALCULATED R AXIS, ECG10: 64 DEGREES
CALCULATED T AXIS, ECG11: 82 DEGREES
CHLORIDE SERPL-SCNC: 101 MMOL/L (ref 97–108)
CO2 SERPL-SCNC: 31 MMOL/L (ref 21–32)
COLOR UR: ABNORMAL
CREAT SERPL-MCNC: 1.88 MG/DL (ref 0.55–1.02)
CREAT UR-MCNC: 108.15 MG/DL
DIAGNOSIS, 93000: NORMAL
EOSINOPHIL # BLD: 0 K/UL (ref 0–0.4)
EOSINOPHIL NFR BLD: 0 % (ref 0–7)
EPITH CASTS URNS QL MICRO: ABNORMAL /LPF
ERYTHROCYTE [DISTWIDTH] IN BLOOD BY AUTOMATED COUNT: 14 % (ref 11.5–14.5)
GLOBULIN SER CALC-MCNC: 4.3 G/DL (ref 2–4)
GLUCOSE BLD STRIP.AUTO-MCNC: 189 MG/DL (ref 65–100)
GLUCOSE BLD STRIP.AUTO-MCNC: 206 MG/DL (ref 65–100)
GLUCOSE BLD STRIP.AUTO-MCNC: 226 MG/DL (ref 65–100)
GLUCOSE BLD STRIP.AUTO-MCNC: 290 MG/DL (ref 65–100)
GLUCOSE SERPL-MCNC: 215 MG/DL (ref 65–100)
GLUCOSE UR STRIP.AUTO-MCNC: NEGATIVE MG/DL
HCT VFR BLD AUTO: 29 % (ref 35–47)
HGB BLD-MCNC: 9.7 G/DL (ref 11.5–16)
HGB UR QL STRIP: NEGATIVE
HYALINE CASTS URNS QL MICRO: ABNORMAL /LPF (ref 0–5)
KETONES UR QL STRIP.AUTO: NEGATIVE MG/DL
LEUKOCYTE ESTERASE UR QL STRIP.AUTO: ABNORMAL
LYMPHOCYTES # BLD AUTO: 10 % (ref 12–49)
LYMPHOCYTES # BLD: 0.9 K/UL (ref 0.8–3.5)
MCH RBC QN AUTO: 31.8 PG (ref 26–34)
MCHC RBC AUTO-ENTMCNC: 33.4 G/DL (ref 30–36.5)
MCV RBC AUTO: 95.1 FL (ref 80–99)
MONOCYTES # BLD: 0.1 K/UL (ref 0–1)
MONOCYTES NFR BLD AUTO: 1 % (ref 5–13)
NEUTS SEG # BLD: 8.4 K/UL (ref 1.8–8)
NEUTS SEG NFR BLD AUTO: 89 % (ref 32–75)
NITRITE UR QL STRIP.AUTO: NEGATIVE
OSMOLALITY UR: 388 MOSM/KG H2O
P-R INTERVAL, ECG05: 132 MS
PH UR STRIP: 5.5 [PH] (ref 5–8)
PLATELET # BLD AUTO: 218 K/UL (ref 150–400)
POTASSIUM SERPL-SCNC: 3.8 MMOL/L (ref 3.5–5.1)
PROT SERPL-MCNC: 7.3 G/DL (ref 6.4–8.2)
PROT UR STRIP-MCNC: ABNORMAL MG/DL
Q-T INTERVAL, ECG07: 382 MS
QRS DURATION, ECG06: 78 MS
QTC CALCULATION (BEZET), ECG08: 474 MS
RBC # BLD AUTO: 3.05 M/UL (ref 3.8–5.2)
RBC #/AREA URNS HPF: ABNORMAL /HPF (ref 0–5)
SERVICE CMNT-IMP: ABNORMAL
SODIUM SERPL-SCNC: 138 MMOL/L (ref 136–145)
SODIUM UR-SCNC: 21 MMOL/L
SP GR UR REFRACTOMETRY: 1.02 (ref 1–1.03)
UA: UC IF INDICATED,UAUC: ABNORMAL
UROBILINOGEN UR QL STRIP.AUTO: 0.2 EU/DL (ref 0.2–1)
UUN UR-MCNC: 727 MG/DL
VENTRICULAR RATE, ECG03: 93 BPM
WBC # BLD AUTO: 9.4 K/UL (ref 3.6–11)
WBC URNS QL MICRO: >100 /HPF (ref 0–4)

## 2017-06-28 PROCEDURE — 85025 COMPLETE CBC W/AUTO DIFF WBC: CPT | Performed by: INTERNAL MEDICINE

## 2017-06-28 PROCEDURE — 77030013032 HC MSK BPAP/CPAP FISP -B

## 2017-06-28 PROCEDURE — 83935 ASSAY OF URINE OSMOLALITY: CPT | Performed by: INTERNAL MEDICINE

## 2017-06-28 PROCEDURE — 80053 COMPREHEN METABOLIC PANEL: CPT | Performed by: INTERNAL MEDICINE

## 2017-06-28 PROCEDURE — 94640 AIRWAY INHALATION TREATMENT: CPT

## 2017-06-28 PROCEDURE — 77010033678 HC OXYGEN DAILY

## 2017-06-28 PROCEDURE — 82962 GLUCOSE BLOOD TEST: CPT

## 2017-06-28 PROCEDURE — 82570 ASSAY OF URINE CREATININE: CPT | Performed by: INTERNAL MEDICINE

## 2017-06-28 PROCEDURE — 87077 CULTURE AEROBIC IDENTIFY: CPT | Performed by: INTERNAL MEDICINE

## 2017-06-28 PROCEDURE — 36415 COLL VENOUS BLD VENIPUNCTURE: CPT | Performed by: INTERNAL MEDICINE

## 2017-06-28 PROCEDURE — 65660000000 HC RM CCU STEPDOWN

## 2017-06-28 PROCEDURE — 74011250636 HC RX REV CODE- 250/636: Performed by: INTERNAL MEDICINE

## 2017-06-28 PROCEDURE — 93970 EXTREMITY STUDY: CPT

## 2017-06-28 PROCEDURE — 84540 ASSAY OF URINE/UREA-N: CPT | Performed by: INTERNAL MEDICINE

## 2017-06-28 PROCEDURE — 87086 URINE CULTURE/COLONY COUNT: CPT | Performed by: INTERNAL MEDICINE

## 2017-06-28 PROCEDURE — 74011636637 HC RX REV CODE- 636/637: Performed by: INTERNAL MEDICINE

## 2017-06-28 PROCEDURE — 84300 ASSAY OF URINE SODIUM: CPT | Performed by: INTERNAL MEDICINE

## 2017-06-28 PROCEDURE — 81001 URINALYSIS AUTO W/SCOPE: CPT | Performed by: INTERNAL MEDICINE

## 2017-06-28 PROCEDURE — 74011000250 HC RX REV CODE- 250: Performed by: INTERNAL MEDICINE

## 2017-06-28 PROCEDURE — 74011000258 HC RX REV CODE- 258: Performed by: INTERNAL MEDICINE

## 2017-06-28 PROCEDURE — 74011250637 HC RX REV CODE- 250/637: Performed by: INTERNAL MEDICINE

## 2017-06-28 PROCEDURE — 87186 SC STD MICRODIL/AGAR DIL: CPT | Performed by: INTERNAL MEDICINE

## 2017-06-28 RX ORDER — ROPINIROLE 0.25 MG/1
0.25 TABLET, FILM COATED ORAL
Status: DISCONTINUED | OUTPATIENT
Start: 2017-06-28 | End: 2017-06-29 | Stop reason: HOSPADM

## 2017-06-28 RX ORDER — MAGNESIUM SULFATE 100 %
4 CRYSTALS MISCELLANEOUS AS NEEDED
Status: DISCONTINUED | OUTPATIENT
Start: 2017-06-28 | End: 2017-06-29 | Stop reason: HOSPADM

## 2017-06-28 RX ORDER — CLOPIDOGREL BISULFATE 75 MG/1
75 TABLET ORAL
Status: DISCONTINUED | OUTPATIENT
Start: 2017-06-28 | End: 2017-06-29 | Stop reason: HOSPADM

## 2017-06-28 RX ORDER — FERROUS SULFATE, DRIED 160(50) MG
1 TABLET, EXTENDED RELEASE ORAL 2 TIMES DAILY WITH MEALS
Status: DISCONTINUED | OUTPATIENT
Start: 2017-06-28 | End: 2017-06-29 | Stop reason: HOSPADM

## 2017-06-28 RX ORDER — LANOLIN ALCOHOL/MO/W.PET/CERES
325 CREAM (GRAM) TOPICAL
Status: DISCONTINUED | OUTPATIENT
Start: 2017-06-28 | End: 2017-06-29 | Stop reason: HOSPADM

## 2017-06-28 RX ORDER — BUMETANIDE 1 MG/1
2 TABLET ORAL 2 TIMES DAILY
Status: DISCONTINUED | OUTPATIENT
Start: 2017-06-28 | End: 2017-06-29 | Stop reason: HOSPADM

## 2017-06-28 RX ORDER — TRAMADOL HYDROCHLORIDE 50 MG/1
100 TABLET ORAL
Status: DISCONTINUED | OUTPATIENT
Start: 2017-06-28 | End: 2017-06-29 | Stop reason: HOSPADM

## 2017-06-28 RX ORDER — DOCUSATE SODIUM 100 MG/1
100 CAPSULE, LIQUID FILLED ORAL 2 TIMES DAILY
Status: DISCONTINUED | OUTPATIENT
Start: 2017-06-28 | End: 2017-06-29 | Stop reason: HOSPADM

## 2017-06-28 RX ORDER — PRAVASTATIN SODIUM 20 MG/1
40 TABLET ORAL
Status: DISCONTINUED | OUTPATIENT
Start: 2017-06-28 | End: 2017-06-29 | Stop reason: HOSPADM

## 2017-06-28 RX ORDER — IPRATROPIUM BROMIDE AND ALBUTEROL SULFATE 2.5; .5 MG/3ML; MG/3ML
3 SOLUTION RESPIRATORY (INHALATION)
Status: DISCONTINUED | OUTPATIENT
Start: 2017-06-29 | End: 2017-06-29

## 2017-06-28 RX ORDER — METOPROLOL TARTRATE 25 MG/1
12.5 TABLET, FILM COATED ORAL 2 TIMES DAILY
Status: DISCONTINUED | OUTPATIENT
Start: 2017-06-28 | End: 2017-06-29 | Stop reason: HOSPADM

## 2017-06-28 RX ORDER — LANOLIN ALCOHOL/MO/W.PET/CERES
10 CREAM (GRAM) TOPICAL
Status: DISCONTINUED | OUTPATIENT
Start: 2017-06-28 | End: 2017-06-29 | Stop reason: HOSPADM

## 2017-06-28 RX ORDER — DOXYCYCLINE HYCLATE 100 MG
100 TABLET ORAL EVERY 12 HOURS
Status: DISCONTINUED | OUTPATIENT
Start: 2017-06-29 | End: 2017-06-29 | Stop reason: HOSPADM

## 2017-06-28 RX ORDER — BACLOFEN 10 MG/1
5 TABLET ORAL 2 TIMES DAILY
Status: DISCONTINUED | OUTPATIENT
Start: 2017-06-28 | End: 2017-06-29 | Stop reason: HOSPADM

## 2017-06-28 RX ORDER — MELATONIN
1000 DAILY
Status: DISCONTINUED | OUTPATIENT
Start: 2017-06-28 | End: 2017-06-29 | Stop reason: HOSPADM

## 2017-06-28 RX ORDER — DULOXETIN HYDROCHLORIDE 30 MG/1
60 CAPSULE, DELAYED RELEASE ORAL
Status: DISCONTINUED | OUTPATIENT
Start: 2017-06-28 | End: 2017-06-29 | Stop reason: HOSPADM

## 2017-06-28 RX ORDER — FLUTICASONE FUROATE AND VILANTEROL 100; 25 UG/1; UG/1
1 POWDER RESPIRATORY (INHALATION) DAILY
Status: DISCONTINUED | OUTPATIENT
Start: 2017-06-28 | End: 2017-06-28

## 2017-06-28 RX ORDER — BACLOFEN 10 MG/1
10 TABLET ORAL 2 TIMES DAILY
Status: DISCONTINUED | OUTPATIENT
Start: 2017-06-28 | End: 2017-06-28

## 2017-06-28 RX ORDER — GABAPENTIN 300 MG/1
600 CAPSULE ORAL
Status: DISCONTINUED | OUTPATIENT
Start: 2017-06-28 | End: 2017-06-29 | Stop reason: HOSPADM

## 2017-06-28 RX ORDER — INSULIN LISPRO 100 [IU]/ML
INJECTION, SOLUTION INTRAVENOUS; SUBCUTANEOUS
Status: DISCONTINUED | OUTPATIENT
Start: 2017-06-28 | End: 2017-06-29 | Stop reason: HOSPADM

## 2017-06-28 RX ORDER — HEPARIN SODIUM 5000 [USP'U]/ML
5000 INJECTION, SOLUTION INTRAVENOUS; SUBCUTANEOUS EVERY 8 HOURS
Status: DISCONTINUED | OUTPATIENT
Start: 2017-06-28 | End: 2017-06-29 | Stop reason: HOSPADM

## 2017-06-28 RX ORDER — PRAMIPEXOLE DIHYDROCHLORIDE 0.25 MG/1
0.5 TABLET ORAL
Status: DISCONTINUED | OUTPATIENT
Start: 2017-06-28 | End: 2017-06-29 | Stop reason: HOSPADM

## 2017-06-28 RX ORDER — OXYBUTYNIN CHLORIDE 5 MG/1
10 TABLET, EXTENDED RELEASE ORAL
Status: DISCONTINUED | OUTPATIENT
Start: 2017-06-28 | End: 2017-06-29 | Stop reason: HOSPADM

## 2017-06-28 RX ORDER — ALBUTEROL SULFATE 0.83 MG/ML
2.5 SOLUTION RESPIRATORY (INHALATION)
Status: DISCONTINUED | OUTPATIENT
Start: 2017-06-28 | End: 2017-06-29 | Stop reason: HOSPADM

## 2017-06-28 RX ORDER — RANOLAZINE 500 MG/1
1000 TABLET, EXTENDED RELEASE ORAL 2 TIMES DAILY
Status: DISCONTINUED | OUTPATIENT
Start: 2017-06-28 | End: 2017-06-29 | Stop reason: HOSPADM

## 2017-06-28 RX ORDER — DEXTROSE 50 % IN WATER (D50W) INTRAVENOUS SYRINGE
12.5-25 AS NEEDED
Status: DISCONTINUED | OUTPATIENT
Start: 2017-06-28 | End: 2017-06-29 | Stop reason: HOSPADM

## 2017-06-28 RX ORDER — BUSPIRONE HYDROCHLORIDE 5 MG/1
5 TABLET ORAL 3 TIMES DAILY
Status: DISCONTINUED | OUTPATIENT
Start: 2017-06-28 | End: 2017-06-29 | Stop reason: HOSPADM

## 2017-06-28 RX ORDER — PANTOPRAZOLE SODIUM 40 MG/1
40 TABLET, DELAYED RELEASE ORAL
Status: DISCONTINUED | OUTPATIENT
Start: 2017-06-28 | End: 2017-06-29 | Stop reason: HOSPADM

## 2017-06-28 RX ADMIN — VITAMIN D, TAB 1000IU (100/BT) 1000 UNITS: 25 TAB at 08:02

## 2017-06-28 RX ADMIN — INSULIN LISPRO 2 UNITS: 100 INJECTION, SOLUTION INTRAVENOUS; SUBCUTANEOUS at 22:53

## 2017-06-28 RX ADMIN — METOPROLOL TARTRATE 12.5 MG: 25 TABLET ORAL at 09:28

## 2017-06-28 RX ADMIN — BACLOFEN 5 MG: 10 TABLET ORAL at 08:02

## 2017-06-28 RX ADMIN — METOPROLOL TARTRATE 12.5 MG: 25 TABLET ORAL at 22:57

## 2017-06-28 RX ADMIN — BUSPIRONE HYDROCHLORIDE 5 MG: 5 TABLET ORAL at 17:22

## 2017-06-28 RX ADMIN — Medication 10 ML: at 23:07

## 2017-06-28 RX ADMIN — RANOLAZINE 1000 MG: 500 TABLET, FILM COATED, EXTENDED RELEASE ORAL at 17:22

## 2017-06-28 RX ADMIN — OYSTER SHELL CALCIUM WITH VITAMIN D 1 TABLET: 500; 200 TABLET, FILM COATED ORAL at 08:03

## 2017-06-28 RX ADMIN — DULOXETINE HYDROCHLORIDE 60 MG: 30 CAPSULE, DELAYED RELEASE ORAL at 22:59

## 2017-06-28 RX ADMIN — DOCUSATE SODIUM 100 MG: 100 CAPSULE ORAL at 17:21

## 2017-06-28 RX ADMIN — OXYBUTYNIN CHLORIDE 10 MG: 5 TABLET, EXTENDED RELEASE ORAL at 23:03

## 2017-06-28 RX ADMIN — GABAPENTIN 600 MG: 300 CAPSULE ORAL at 01:52

## 2017-06-28 RX ADMIN — CLOPIDOGREL 75 MG: 75 TABLET, FILM COATED ORAL at 22:59

## 2017-06-28 RX ADMIN — INSULIN LISPRO 2 UNITS: 100 INJECTION, SOLUTION INTRAVENOUS; SUBCUTANEOUS at 17:23

## 2017-06-28 RX ADMIN — METHYLPREDNISOLONE SODIUM SUCCINATE 60 MG: 125 INJECTION, POWDER, FOR SOLUTION INTRAMUSCULAR; INTRAVENOUS at 22:55

## 2017-06-28 RX ADMIN — ROPINIROLE HYDROCHLORIDE 0.25 MG: 0.25 TABLET, FILM COATED ORAL at 22:00

## 2017-06-28 RX ADMIN — PANTOPRAZOLE SODIUM 40 MG: 40 TABLET, DELAYED RELEASE ORAL at 01:52

## 2017-06-28 RX ADMIN — PRAVASTATIN SODIUM 40 MG: 20 TABLET ORAL at 01:54

## 2017-06-28 RX ADMIN — CEPHALEXIN 500 MG: 250 CAPSULE ORAL at 00:07

## 2017-06-28 RX ADMIN — PRAVASTATIN SODIUM 40 MG: 20 TABLET ORAL at 22:58

## 2017-06-28 RX ADMIN — IPRATROPIUM BROMIDE AND ALBUTEROL SULFATE 3 ML: .5; 3 SOLUTION RESPIRATORY (INHALATION) at 15:21

## 2017-06-28 RX ADMIN — BACLOFEN 5 MG: 10 TABLET ORAL at 17:22

## 2017-06-28 RX ADMIN — DOCUSATE SODIUM 100 MG: 100 CAPSULE ORAL at 09:28

## 2017-06-28 RX ADMIN — FERROUS SULFATE TAB 325 MG (65 MG ELEMENTAL FE) 325 MG: 325 (65 FE) TAB at 08:01

## 2017-06-28 RX ADMIN — BUMETANIDE 2 MG: 1 TABLET ORAL at 09:29

## 2017-06-28 RX ADMIN — BUMETANIDE 2 MG: 1 TABLET ORAL at 17:21

## 2017-06-28 RX ADMIN — OXYBUTYNIN CHLORIDE 10 MG: 5 TABLET, EXTENDED RELEASE ORAL at 01:54

## 2017-06-28 RX ADMIN — CEFEPIME 2 G: 2 INJECTION, POWDER, FOR SOLUTION INTRAVENOUS at 12:08

## 2017-06-28 RX ADMIN — BUSPIRONE HYDROCHLORIDE 5 MG: 5 TABLET ORAL at 08:02

## 2017-06-28 RX ADMIN — METOPROLOL TARTRATE 12.5 MG: 25 TABLET ORAL at 17:22

## 2017-06-28 RX ADMIN — METHYLPREDNISOLONE SODIUM SUCCINATE 60 MG: 125 INJECTION, POWDER, FOR SOLUTION INTRAMUSCULAR; INTRAVENOUS at 04:19

## 2017-06-28 RX ADMIN — IPRATROPIUM BROMIDE AND ALBUTEROL SULFATE 3 ML: .5; 3 SOLUTION RESPIRATORY (INHALATION) at 08:12

## 2017-06-28 RX ADMIN — Medication 1 CAPSULE: at 08:03

## 2017-06-28 RX ADMIN — PANTOPRAZOLE SODIUM 40 MG: 40 TABLET, DELAYED RELEASE ORAL at 23:02

## 2017-06-28 RX ADMIN — RANOLAZINE 1000 MG: 500 TABLET, FILM COATED, EXTENDED RELEASE ORAL at 08:02

## 2017-06-28 RX ADMIN — MELATONIN TAB 3 MG 10.5 MG: 3 TAB at 01:55

## 2017-06-28 RX ADMIN — IPRATROPIUM BROMIDE AND ALBUTEROL SULFATE 3 ML: .5; 3 SOLUTION RESPIRATORY (INHALATION) at 04:17

## 2017-06-28 RX ADMIN — PRAMIPEXOLE DIHYDROCHLORIDE 0.5 MG: 0.25 TABLET ORAL at 01:53

## 2017-06-28 RX ADMIN — OYSTER SHELL CALCIUM WITH VITAMIN D 1 TABLET: 500; 200 TABLET, FILM COATED ORAL at 17:21

## 2017-06-28 RX ADMIN — METHYLPREDNISOLONE SODIUM SUCCINATE 60 MG: 125 INJECTION, POWDER, FOR SOLUTION INTRAMUSCULAR; INTRAVENOUS at 13:45

## 2017-06-28 RX ADMIN — TRAMADOL HYDROCHLORIDE 100 MG: 50 TABLET, FILM COATED ORAL at 23:00

## 2017-06-28 RX ADMIN — MELATONIN TAB 3 MG 10.5 MG: 3 TAB at 22:53

## 2017-06-28 RX ADMIN — BUSPIRONE HYDROCHLORIDE 5 MG: 5 TABLET ORAL at 23:00

## 2017-06-28 RX ADMIN — ROPINIROLE HYDROCHLORIDE 0.25 MG: 0.25 TABLET, FILM COATED ORAL at 01:52

## 2017-06-28 RX ADMIN — INSULIN LISPRO 5 UNITS: 100 INJECTION, SOLUTION INTRAVENOUS; SUBCUTANEOUS at 08:04

## 2017-06-28 RX ADMIN — PRAMIPEXOLE DIHYDROCHLORIDE 0.5 MG: 0.25 TABLET ORAL at 22:57

## 2017-06-28 RX ADMIN — HEPARIN SODIUM 5000 UNITS: 5000 INJECTION, SOLUTION INTRAVENOUS; SUBCUTANEOUS at 23:00

## 2017-06-28 RX ADMIN — FLUTICASONE FUROATE AND VILANTEROL TRIFENATATE 1 PUFF: 100; 25 POWDER RESPIRATORY (INHALATION) at 08:08

## 2017-06-28 RX ADMIN — DULOXETINE HYDROCHLORIDE 60 MG: 30 CAPSULE, DELAYED RELEASE ORAL at 01:53

## 2017-06-28 RX ADMIN — GABAPENTIN 600 MG: 300 CAPSULE ORAL at 22:59

## 2017-06-28 RX ADMIN — CLOPIDOGREL 75 MG: 75 TABLET, FILM COATED ORAL at 01:52

## 2017-06-28 RX ADMIN — INSULIN LISPRO 5 UNITS: 100 INJECTION, SOLUTION INTRAVENOUS; SUBCUTANEOUS at 12:21

## 2017-06-28 NOTE — ROUTINE PROCESS
Bedside and Verbal shift change report given to Olga[address] [place] (oncoming nurse) by Will RN (offgoing nurse). Report included the following information SBAR, Kardex, Intake/Output and MAR.

## 2017-06-28 NOTE — ED NOTES
Pt sitting on side of bed eating breakfast. Does not appear to be in any distress.  O2 via NC in place and sats are in mid 90s

## 2017-06-28 NOTE — PROGRESS NOTES
6/28/2017   CARE MANAGEMENT NOTE:  CM is following pt in the ER for initial discharge planning. EMR reviewed. CM met with pt who was her own historian for this needs assessment. Reportedly, pt resides with her mother and CAMRON in a two story home with basement and bedroom on the ground floor. There are five steps to the home and a ramp. PTA, pt was ambulatory, indepn with ADLs, and relies on her DIL for transportation. Pt is unemployed and on disability. She uses [employer]. Pt has had "[facility] in the past.  DME in the home includes a rolling walker, hospital bed, BSC, shower chair and bench, Bipap, and oxygen with portable tank thru [address]. PCP is Dr. Claire Riley. Plan is to return home when medically stable.   Dominik

## 2017-06-28 NOTE — CONSULTS
Name: Lion: [employer]   : 1960 Admit Date: 2017   Phone: [phone]  Room: ER14/14   PCP: Damián Hahn MD  MRN: [id number]   Date: 2017  Code: Full Code        HPI:    Chart and notes reviewed. Data reviewed. I review the patient's current medications in the medical record at each encounter. I have evaluated and examined the patient. 11:29 AM       History was obtained from patient. I was asked by Ron Lewsi MD to see Sherri Moreno in consultation for a chief complaint of COPD exacerbation. Ms. Jenae Booker is a pleasant 60year old female with history of COPD, CHF, CAD, and ALLY who presented to the [facility] ED with 2-3 days of progressively worsening SOB, worse with exertion. Denies fever, chills, cough, or CP. Reports chronic LE edema, slightly worse than baseline, with developing cellulitis on the RLE. Patient follow at [facility] for all her health care, including COPD and sleep apnea. States they were on diversion, so she was brought here by EMS. Reports being hospitalized there just 3-4 weeks ago with COPD exacerbation. She does not use home O2 or inhalers at baseline as she states all nebs and inhalers give her persistent, dry cough and don't seem to improve her symptoms very much. She does report compliance with home BiPAP at 18/9 with 4L bled in. She uses 3L O2 during the day. Reports extensive cardiac history including CABG x 3, stents x 4, \"carotid arteries cleaned out\", and CHF. Reports taking Bumex 4 mg BID at baseline and states she is compliant with this. Has a cat in the home, but the cat stays upstairs with her sister-in-law. No known allergies. She continues to smoke, but states she is trying to cut back. CXR is personally visualized. The lungs are clear bilaterally.     WBC 9.4 (13.1 at admission)  Hgb 9.7 (10.2 at admission)  Creat 1.88 (2.09 at admission)  proBNP 313  UA with 4+ bacteria  ABG 7. 39/49/1/29    Past Medical History:   Diagnosis Date    [facility] flutter [facility] [facility]     Carotid artery stenosis     CHF (congestive heart failure) (HCC)     Chronic obstructive pulmonary disease (HCC)     Diabetes ([facility] [address] [facility]     Diverticulitis     Esophageal dysmotility     Heart murmur     Ischemia 03/14/2015    CVA [id number]    Leucocytoclastic vasculitis (HCC)     Narcolepsy     Obstructive sleep apnea     ALLY treated with BiPAP     Pituitary microadenoma (HCC)     Ptosis     Restless leg syndrome     Tinnitus        Past Surgical History:   Procedure Laterality Date    HX CORONARY ARTERY BYPASS GRAFT  09/09/2011    x3        History reviewed. No pertinent family history. Social History   Substance Use Topics    Smoking status: Current Every Day Smoker     Packs/day: 0.50    Smokeless tobacco: Never Used    Alcohol use No       Allergies   Allergen Reactions    Macrodantin [Nitrofurantoin Macrocrystal] Anaphylaxis    Shellfish Derived Anaphylaxis    Cefzil [Cefprozil] Rash    Ciprofloxacin Shortness of Breath    Codeine Rash    Cortisone Rash    Endocet [Oxycodone-Acetaminophen] Rash    Hydrocodone Rash    Ibuprofen Other (comments)     Cellulitis    Influenza Virus Vaccines Other (comments)     Pyrexia    Lipitor [Atorvastatin] Other (comments)     Blister    Monurol [Fosfomycin Tromethamine] Nausea and Vomiting    Penicillins Shortness of Breath    Pneumococcal Vaccine Other (comments)     Pyrexia    Sulfa (Sulfonamide Antibiotics) Hives    Topiramate Other (comments)     Slurred speech    Torsemide Shortness of Breath    Adhesive Tape-Silicones Rash    Tapentadol Rash     Not tapentadol. ...tape       Current Facility-Administered Medications   Medication Dose Route Frequency    [START ON 6/29/2017] doxycycline (VIBRA-TABS) tablet 100 mg  100 mg Oral Q12H    bumetanide (BUMEX) tablet 2 mg  2 mg Oral BID    busPIRone (BUSPAR) tablet 5 mg  5 mg Oral TID    clopidogrel (PLAVIX) tablet 75 mg  75 mg Oral QHS    docusate sodium (COLACE) capsule 100 mg  100 mg Oral BID    cholecalciferol (VITAMIN D3) tablet 1,000 Units  1,000 Units Oral DAILY    DULoxetine (CYMBALTA) capsule 60 mg  60 mg Oral QHS    ferrous sulfate tablet 325 mg  325 mg Oral ACB    gabapentin (NEURONTIN) capsule 600 mg  600 mg Oral QHS    melatonin tablet 10.5 mg  10.5 mg Oral QHS    metoprolol tartrate (LOPRESSOR) tablet 12.5 mg  12.5 mg Oral BID    oxybutynin chloride XL (DITROPAN XL) tablet 10 mg  10 mg Oral QHS    pantoprazole (PROTONIX) tablet 40 mg  40 mg Oral QHS    pramipexole (MIRAPEX) tablet 0.5 mg  0.5 mg Oral QHS    pravastatin (PRAVACHOL) tablet 40 mg  40 mg Oral QHS    ranolazine ER (RANEXA) tablet 1,000 mg  1,000 mg Oral BID    rOPINIRole (REQUIP) tablet 0.25 mg  0.25 mg Oral DAILY PRN    rOPINIRole (REQUIP) tablet 0.25 mg  0.25 mg Oral QHS    traMADol (ULTRAM) tablet 100 mg  100 mg Oral QHS PRN    baclofen (LIORESAL) tablet 5 mg  5 mg Oral BID    calcium-vitamin D (OS-LEON) 500 mg-200 unit tablet  1 Tab Oral BID WITH MEALS    insulin lispro (HUMALOG) injection   SubCUTAneous AC&HS    glucose chewable tablet 16 g  4 Tab Oral PRN    dextrose (D50W) injection syrg 12.5-25 g  12.5-25 g IntraVENous PRN    glucagon (GLUCAGEN) injection 1 mg  1 mg IntraMUSCular PRN    albuterol (PROVENTIL VENTOLIN) nebulizer solution 2.5 mg  2.5 mg Nebulization Q2H PRN    fluticasone-vilanterol (BREO ELLIPTA) 100mcg-25mcg/puff  1 Puff Inhalation DAILY    heparin (porcine) injection 5,000 Units  5,000 Units SubCUTAneous Q8H    methylPREDNISolone (PF) (SOLU-MEDROL) injection 60 mg  60 mg IntraVENous Q8H    cefepime (MAXIPIME) 2 g in 0.9% sodium chloride (MBP/ADV) 100 mL  2 g IntraVENous Q24H    sodium chloride (NS) flush 5-10 mL  5-10 mL IntraVENous Q8H    sodium chloride (NS) flush 5-10 mL  5-10 mL IntraVENous PRN    naloxone (NARCAN) injection 0.4 mg  0.4 mg IntraVENous PRN    lactobac ac& rosa-b.anim (ROE Q/RISAQUAD)  1 Cap Oral DAILY    albuterol-ipratropium (DUO-NEB) 2.5 MG-0.5 MG/3 ML  3 mL Nebulization Q6H RT     Current Outpatient Prescriptions   Medication Sig    baclofen (LIORESAL) 10 mg tablet Take 10 mg by mouth two (2) times a day.  bumetanide (BUMEX) 2 mg tablet Take 4 mg by mouth two (2) times a day.  cholecalciferol (VITAMIN D3) 1,000 unit tablet Take 1,000 Units by mouth daily.  clopidogrel (PLAVIX) 75 mg tab Take 75 mg by mouth nightly.  docusate sodium (COLACE) 100 mg capsule Take 100 mg by mouth two (2) times a day.  DULoxetine (CYMBALTA) 60 mg capsule Take 60 mg by mouth nightly.  ferrous sulfate 325 mg (65 mg iron) tablet Take 325 mg by mouth Daily (before breakfast).  gabapentin (NEURONTIN) 300 mg capsule Take 600 mg by mouth nightly.  melatonin tab tablet Take 10 mg by mouth nightly.  oxybutynin chloride XL (DITROPAN XL) 10 mg CR tablet Take 10 mg by mouth nightly.  pantoprazole (PROTONIX) 40 mg tablet Take 40 mg by mouth nightly.  potassium chloride (K-DUR, KLOR-CON) 20 mEq tablet Take 20 mEq by mouth daily.  pramipexole (MIRAPEX) 0.5 mg tablet Take 0.5 mg by mouth nightly.  pravastatin (PRAVACHOL) 40 mg tablet Take 40 mg by mouth nightly.  ranolazine ER (RANEXA) 500 mg SR tablet Take 1,000 mg by mouth two (2) times a day.  rOPINIRole (REQUIP) 0.25 mg tablet Take 0.25 mg by mouth daily as needed. In addition to one tablet nightly    spironolactone (ALDACTONE) 50 mg tablet Take 50 mg by mouth every Monday, Wednesday, Friday.  traMADol (ULTRAM) 50 mg tablet Take 100 mg by mouth nightly.  metoprolol tartrate (LOPRESSOR) 25 mg tablet Take 12.5 mg by mouth two (2) times a day.  rOPINIRole (REQUIP) 0.25 mg tablet Take 0.25 mg by mouth nightly.  busPIRone (BUSPAR) 5 mg tablet Take 5 mg by mouth three (3) times daily as needed.     calcium carbonate-vitamin d2 500 mg(1,250mg) -200 unit tab Take 1 Tab by mouth two (2) times a day. REVIEW OF SYSTEMS   Negative except as stated in the HPI. Physical Exam:   Visit Vitals    /68    Pulse 100    Temp 97.6 °F (36.4 °C)    Resp 18    Ht 5' 3\" (1.6 m)    Wt 131.5 kg (290 lb)    SpO2 95%    Breastfeeding No    BMI 51.37 kg/m2     General:  Alert, cooperative, no distress, appears stated age. Head:  Normocephalic, without obvious abnormality, atraumatic. Eyes:  Conjunctivae/corneas clear. Nose: Nares normal. Septum midline. Mucosa normal.    Throat: Lips, mucosa, and tongue normal. Class 3 airway. Neck: Thick, supple, symmetrical, trachea midline, no adenopathy. Lungs:   Diminished breath sounds, no wheeze or rales appreciated. Chest wall:  No tenderness or deformity. Heart:  Regular rate and rhythm, S1, S2 normal, no murmur, click, rub or gallop. Abdomen:   Obese, soft, non-tender. Bowel sounds normal. No masses,  No organomegaly. Extremities: Extremities normal, atraumatic, no cyanosis, 1+ LE edema. Pulses: 2+ and symmetric all extremities. Skin: Skin color, texture, turgor normal. No rashes. Small blister with surrounding erythema on right anterior tibia. Lymph nodes: Cervical, supraclavicular nodes normal.   Neurologic: Grossly nonfocal       Lab Results   Component Value Date/Time    Sodium 138 06/28/2017 04:29 AM    Potassium 3.8 06/28/2017 04:29 AM    Chloride 101 06/28/2017 04:29 AM    CO2 31 06/28/2017 04:29 AM    BUN 28 06/28/2017 04:29 AM    Creatinine 1.88 06/28/2017 04:29 AM    Glucose 215 06/28/2017 04:29 AM    Calcium 8.6 06/28/2017 04:29 AM    Magnesium 2.0 07/22/2010 03:30 AM       Lab Results   Component Value Date/Time    WBC 9.4 06/28/2017 04:29 AM    HGB 9.7 06/28/2017 04:29 AM    PLATELET 143 66/01/5632 04:29 AM    MCV 95.1 06/28/2017 04:29 AM       Lab Results   Component Value Date/Time    INR 1.0 06/27/2017 08:56 PM    aPTT 23.6 06/27/2017 08:56 PM    AST (SGOT) 15 06/28/2017 04:29 AM    Alk.  phosphatase 75 06/28/2017 04:29 AM    Protein, total 7.3 06/28/2017 04:29 AM    Albumin 3.0 06/28/2017 04:29 AM    Globulin 4.3 06/28/2017 04:29 AM       No results found for: IRON, FE, TIBC, IBCT, PSAT, FERR    No results found for: SR, CRP, RAFAEL, ANAIGG, RA, RPR, RPRT, VDRLT, VDRLS, TSH, TSHEXT     Lab Results   Component Value Date/Time    PH 7.39 06/27/2017 08:44 PM    PCO2 49 (H) 06/27/2017 08:44 PM    PO2 81 06/27/2017 08:44 PM    HCO3 29 (H) 06/27/2017 08:44 PM       Lab Results   Component Value Date/Time    Troponin-I, Qt. <0.04 06/27/2017 08:56 PM        No results found for: CULT    No results found for: TOXA1, RPR, HBCM, HBSAG, HAAB, HCAB1, HAAT, G6PD, CRYAC, HIVGT, HIVR, HIV1, HIV12, HIVPC, HIVRPI    No results found for: VANCT, CPK    Lab Results   Component Value Date/Time    Color YELLOW/STRAW 06/28/2017 04:27 AM    Appearance CLOUDY 06/28/2017 04:27 AM    pH (UA) 5.5 06/28/2017 04:27 AM    Protein TRACE 06/28/2017 04:27 AM    Glucose NEGATIVE  06/28/2017 04:27 AM    Ketone NEGATIVE  06/28/2017 04:27 AM    Bilirubin NEGATIVE  06/28/2017 04:27 AM    Blood NEGATIVE  06/28/2017 04:27 AM    Urobilinogen 0.2 06/28/2017 04:27 AM    Nitrites NEGATIVE  06/28/2017 04:27 AM    Leukocyte Esterase MODERATE 06/28/2017 04:27 AM    WBC >100 06/28/2017 04:27 AM    RBC 0-5 06/28/2017 04:27 AM    Bacteria 4+ 06/28/2017 04:27 AM       IMPRESSION  · COPD exacerbation  · Chronic hypoxic respiratory failure  · CO2 retention  · SILVIA  · UTI  · RLE cellulitis  · CHF  · CAD  · DM  · ALLY  · RLS  · GERD  · Tobacco use    PLAN  · Continue O2 to keep sats > 90%; baseline is 3L during the day and 4L bled into BiPAP nightly  · Continue BiPAP at home settings of 18/9  · Continue Duonebs and Solumedrol  · Continue Breo  · Continue Doxy and cefepime per primary team; f/u cultures  · F/U LE dopplers  · Continue diuresis with Bumex  · Renal consulted  · Home regimen for RLS: Mirapex, Requip, and Baclofen  · Request records from [facility] pulmonary and recent hospitalization  · Counseled on smoking cessation  · GI prophylaxis: Protonix  · DVT prophylaxis: sub q heparin      Thank you for allowing us to participate in the care of this patient. We will be happy to follow along in her progress with you.     Kassy Rockwell, [address]

## 2017-06-28 NOTE — ED NOTES
Pt sitting on side of bed. Pt self removed SCDs. Denies any pain or distress. States only having a dry cough, non productive cough. States her cough started after she arrived here.

## 2017-06-28 NOTE — PROGRESS NOTES
[address] [place] [address] care of pt. Pt. Resting in stretcher with call bell at bedside. Identified self as primary nurse. Answered questions and discussed plan of care at this time. Will continue to monitor. 0100 Pt. Resting in hospital bed with bipap at bedside. Pt. Currently watching TV, state will put on bipap when ready to go to sleep as she uses this at home. 0115 databases complete, pt. Asking about her restless leg medications, spoke with Dr. Martha Fallon, states will look at her med rec.     0201 pt. Assisted to [facility].     0215 Pt. Placed bipap on at this time. 0405 Pt. AM labs drawn. Pt. Continues to keep bipap on while sleeping.    0615 pt. Up to [facility].    0719 Bedside and Verbal shift change report given to David Jorgensen (oncoming nurse) by Reba House RN  (offgoing nurse). Report included the following information SBAR, ED Summary, MAR and Cardiac Rhythm NSR.

## 2017-06-28 NOTE — H&P
[address] [address]  [phone]    Hospitalist Admission Note      NAME:  Lee Montoya   :   1960   MRN:  [id number]     PCP:  Wilbert Gomez MD     Date/Time:  2017 11:21 PM          Subjective:     CHIEF COMPLAINT: dyspnea    HISTORY OF PRESENT ILLNESS:     Ms. Devyn Trinidad is a 60 y.o. female w/ hx of HFpEF, CAD, COPD, ALLY on BiPAP at night, DM, chronic hypoxia on home O2 (3L) who presents with dyspnea. Started 3 days ago, moderate, severe, worsening, worse with exertion, better with rest. Not associated chest pain or cough or f/c. Has some BLE edema with mild redness. ED workup was c/w COPD. CXR was clear. ProBNP was 313. Cr was 2.09 (~1.6 one week ago)    Ms. Devyn Trinidad is admitted for further evaluation and management. Past Medical History:   Diagnosis Date    Atrial flutter ([address]     Carotid artery stenosis     CHF (congestive heart failure) (HCC)     Chronic obstructive pulmonary disease (HCC)     Diabetes ([address]     Diverticulitis     Esophageal dysmotility     Heart murmur     Ischemia 2015    CVA [id number]    Leucocytoclastic vasculitis (HCC)     Narcolepsy     Obstructive sleep apnea     ALLY treated with BiPAP     Pituitary microadenoma (HCC)     Ptosis     Restless leg syndrome     Tinnitus         Past Surgical History:   Procedure Laterality Date    HX CORONARY ARTERY BYPASS GRAFT  09/09/2011    x3        Social History   Substance Use Topics    Smoking status: Not on file    Smokeless tobacco: Not on file    Alcohol use Not on file        Family History: +CV disease    Allergies   Allergen Reactions    Adhesive Tape-Silicones Rash    Tapentadol Rash     Not tapentadol. ...tape        Prior to Admission medications    Not on File       Review of Systems:  (bold if positive, if negative)    Gen:  fatigueEyes:  ENT:  CVS:  edemaPulm:  dyspneaGI:    :    MS:  Skin:  Rash, erythema,woundPsych:  Endo:    Hem:  Renal:    Neuro:            Objective:      VITALS:    Vital signs reviewed; most recent are:    Visit Vitals    /57 (BP 1 Location: Right arm)    Pulse 87    Temp 97.6 °F (36.4 °C)    Resp 24    Ht 5' 3\" (1.6 m)    Wt 131.5 kg (290 lb)    SpO2 97%    BMI 51.37 kg/m2     SpO2 Readings from Last 6 Encounters:   06/27/17 97%    O2 Flow Rate (L/min): 2 l/min   No intake or output data in the 24 hours ending 06/27/17 2321         Exam:     Physical Exam:    Gen:  Morbidly obese. Chronically ill-appearing  HEENT:  No scleral icterus, hearing intact to voice, moist mucous membranes  Neck:  Supple, without masses  Resp:  No accessory muscle use. Increased WOB. RR mid to high 20s. Diminished air movement with few exp wheezes, prolonged exp phase  Card: distant, RRR. Normal S1 and S2 without murmurs, rubs, or gallops. 1+ bilateral peripheral lower extremity edema. Abd:  Normoactive bowel sounds. Soft, non-tender, non-distended. No rebound, no guarding. No appreciable hepatosplenomegaly   Musc:  No cyanosis or clubbing  Skin:  No rashes or ulcers; turgor intact. RLE with mild redness and vesicles   Neuro:  Cranial nerves are grossly intact, no focal motor weakness, follows commands appropriately  Psych:  Good insight, normal affect. Alert, oriented x 3. Answers questions appropriately       Labs:    Recent Labs      06/27/17 2056   WBC  13.1*   HGB  10.2*   HCT  31.3*   PLT  238     Recent Labs      06/27/17 2056   NA  139   K  4.0   CL  101   CO2  33*   GLU  184*   BUN  29*   CREA  2.09*   CA  8.6   ALB  3.3*   SGOT  17   ALT  22     No components found for: [place]  Recent Labs      06/27/17 2044   PH  7.39   PCO2  49*   PO2  81   HCO3  29*     Recent Labs      06/27/17 2056   INR  1.0     No results found for: SDES  No results found for: CULT  All other current labs reviewed in the computer.       Imaging/Studies:    CXR: No evidence of acute cardiopulmonary process EKG: NSR, QTc 470s  EKG personally reviewed         Assessment / Plan:       60 y.o. female with hx of HFpEF, CAD, COPD, ALLY on BiPAP at night, DM, chronic hypoxia on home O2 (3L) who presents with dyspnea       Acute on chronic respiratory failure with hypoxemia (HCC) (): on home O2, 3L. Likely 2/2 COPD exacerbation. Did not appear volume-overload to suggest ADHF  -- duonebs scheduled with albuterol nebs prn   -- start IV solu-medrol  -- start LABA/ICS  -- supplemental O2 to keep SpO2 >90%  -- BiPAP QHS for ALLY   -- pulmonology consult      CHF (congestive heart failure) (HCC) (): compensated  -- decrease Bumex to 2mg BID given SILVIA  -- cont metoprolol      SILVIA (acute kidney injury) ([facility] () on CKD 3: unclear etiology. May be 2/2 overdiuresis  -- send UA, urine Na, Cr, urea  -- decrease Bumex  -- follow BMP. Renally dose meds. Decrease baclofen      Cellulitis: mild, RLE  -- multiple allergies notes, start doxycycline      CAD  -- cont Plavix, BB, statin      Type II diabetes mellitus ([facility]): poorly controlled. With CKD3. Reports \"borderline\" DM. On no home meds  -- SSI  -- may need NPH on IV steroids  -- send A1c  -- cont statin. No ACEi due to SILVIA      Morbid obesity with BMI of 50.0-59.9, adult ([facility]  -- would benefit from weight loss and dietary / lifestyle modifications      Code Status: FULL     Surrogate decision maker: family      Reviewed labs results from [facility] on pt's phone.  Reviewed ED notes      Total time spent with patient: 79 Minutes     Risk of deterioration: High                 Care Plan discussed with: ED provider, Patient and Nursing Staff    Discussed:  Care Plan and D/C Planning    Prophylaxis:  Hep SQ    Disposition:  Home w/Family       ___________________________________________________    Attending Physician: Meggan Thomas MD

## 2017-06-28 NOTE — ROUTINE PROCESS
TRANSFER - OUT REPORT:    Verbal report given to Will RN(name) on Raegan Sanchez  being transferred to 5th floor remote telemetry(unit) for routine progression of care       Report consisted of patients Situation, Background, Assessment and   Recommendations(SBAR). Information from the following report(s) SBAR, Kardex, ED Summary, MAR, Recent Results and Cardiac Rhythm sinus rhythm was reviewed with the receiving nurse. Lines:   Peripheral IV 06/27/17 Left;Upper Arm (Active)   Site Assessment Clean, dry, & intact 6/28/2017 11:56 AM   Phlebitis Assessment 0 6/28/2017 11:56 AM   Infiltration Assessment 0 6/28/2017 11:56 AM   Dressing Status Clean, dry, & intact 6/28/2017 11:56 AM   Dressing Type Transparent 6/28/2017 11:56 AM   Hub Color/Line Status Pink;Patent; Flushed 6/28/2017 11:56 AM   Action Taken Catheter retaped 6/28/2017 11:56 AM   Alcohol Cap Used Yes 6/28/2017 11:56 AM        Opportunity for questions and clarification was provided.       Patient transported with:   O2 @ 3 liters  Tech/Paramedic

## 2017-06-28 NOTE — PROGRESS NOTES
[facility]: MED RECONCILIATION    Comments/Recommendations:   · Verified allergies as documented: Updated list per patient list of allergies. · Med rec completed using medication list with Ms. Ruben King. · She reports concern for medications causing SOB and tremors and would like to cut down on her medications. Medications added:     · All; no medications on profile prior to this admission    Information obtained from: Patient, Rx Query, Medication list    Allergies: Macrodantin [nitrofurantoin macrocrystal]; Shellfish derived; Ciprofloxacin; Codeine; Cortisone; Endocet [oxycodone-acetaminophen]; Hydrocodone; Ibuprofen; Influenza virus vaccines; Lipitor [atorvastatin]; Monurol [fosfomycin tromethamine]; Penicillins; Pneumococcal vaccine; Sulfa (sulfonamide antibiotics); Topiramate; Torsemide; Adhesive tape-silicones; and Tapentadol    Prior to Admission Medications:   Prior to Admission Medications   Prescriptions Last Dose Informant Patient Reported? Taking? DULoxetine (CYMBALTA) 60 mg capsule 6/26/2017 at 2200 Self Yes Yes   Sig: Take 60 mg by mouth nightly. baclofen (LIORESAL) 10 mg tablet 6/27/2017 at 1000 Self Yes Yes   Sig: Take 10 mg by mouth two (2) times a day. bumetanide (BUMEX) 2 mg tablet 6/27/2017 at 1300 Self Yes Yes   Sig: Take 4 mg by mouth two (2) times a day. busPIRone (BUSPAR) 5 mg tablet 6/27/2017 at am Self Yes Yes   Sig: Take 5 mg by mouth three (3) times daily as needed. calcium carbonate-vitamin d2 500 mg(1,250mg) -200 unit tab 6/27/2017 at 1000 Self Yes Yes   Sig: Take 1 Tab by mouth two (2) times a day. cholecalciferol (VITAMIN D3) 1,000 unit tablet 6/27/2017 at 1000 Self Yes Yes   Sig: Take 1,000 Units by mouth daily. clopidogrel (PLAVIX) 75 mg tab 6/26/2017 at 2200 Self Yes Yes   Sig: Take 75 mg by mouth nightly. docusate sodium (COLACE) 100 mg capsule 6/27/2017 at 1000 Self Yes Yes   Sig: Take 100 mg by mouth two (2) times a day.    ferrous sulfate 325 mg (65 mg iron) tablet 6/27/2017 at 1000 Self Yes Yes   Sig: Take 325 mg by mouth Daily (before breakfast). gabapentin (NEURONTIN) 300 mg capsule 6/26/2017 at 2200 Self Yes Yes   Sig: Take 600 mg by mouth nightly. melatonin tab tablet 6/26/2017 at 2200 Self Yes Yes   Sig: Take 10 mg by mouth nightly. metoprolol tartrate (LOPRESSOR) 25 mg tablet 6/27/2017 at 1000 Self Yes Yes   Sig: Take 12.5 mg by mouth two (2) times a day. oxybutynin chloride XL (DITROPAN XL) 10 mg CR tablet 6/26/2017 at 2200 Self Yes Yes   Sig: Take 10 mg by mouth nightly. pantoprazole (PROTONIX) 40 mg tablet 6/26/2017 at 2200 Self Yes Yes   Sig: Take 40 mg by mouth nightly. potassium chloride (K-DUR, KLOR-CON) 20 mEq tablet 6/27/2017 at 1000 Self Yes Yes   Sig: Take 20 mEq by mouth daily. pramipexole (MIRAPEX) 0.5 mg tablet 6/26/2017 at 2200 Self Yes Yes   Sig: Take 0.5 mg by mouth nightly. pravastatin (PRAVACHOL) 40 mg tablet 6/26/2017 at 2200 Self Yes Yes   Sig: Take 40 mg by mouth nightly. rOPINIRole (REQUIP) 0.25 mg tablet 6/26/2017 at Unknown time Self Yes Yes   Sig: Take 0.25 mg by mouth daily as needed. In addition to one tablet nightly   rOPINIRole (REQUIP) 0.25 mg tablet 6/26/2017 at 2200 Self Yes Yes   Sig: Take 0.25 mg by mouth nightly. ranolazine ER (RANEXA) 500 mg SR tablet 6/27/2017 at 1000 Self Yes Yes   Sig: Take 1,000 mg by mouth two (2) times a day. spironolactone (ALDACTONE) 50 mg tablet 6/26/2017 at Unknown time Self Yes Yes   Sig: Take 50 mg by mouth every Monday, Wednesday, Friday. traMADol (ULTRAM) 50 mg tablet 6/26/2017 at 2200 Self Yes Yes   Sig: Take 100 mg by mouth nightly.       Facility-Administered Medications: None       Thank you,  Junacho Partida, PharmD     Contact: [phone]

## 2017-06-28 NOTE — PROGRESS NOTES
[facility] [place] [facility]  [address] [facility], [address] [place] [address]  [phone]      Medical Progress Note      NAME: Shira Antonio   :  1960  MRM:  [id number]    Date/Time: 2017         Subjective:     Chief Complaint:  Patient was seen and examined by me. Chart reviewed. Dyspnea is slowly improving. Patient also stated that she gets frequent UTIs       Objective:       Vitals:       Last 24hrs VS reviewed since prior progress note.  Most recent are:    Visit Vitals    /71    Pulse 100    Temp 97.6 °F (36.4 °C)    Resp 22    Ht 5' 3\" (1.6 m)    Wt 131.5 kg (290 lb)    SpO2 99%    Breastfeeding No    BMI 51.37 kg/m2     SpO2 Readings from Last 6 Encounters:   17 99%    O2 Flow Rate (L/min): 2 l/min     Intake/Output Summary (Last 24 hours) at 17 0934  Last data filed at 17 0400   Gross per 24 hour   Intake                0 ml   Output              400 ml   Net             -400 ml        Exam:     Physical Exam:    Gen:  Disheveled, chronically ill appearing, morbidly obese, NAD  HEENT:  Pink conjunctivae, PERRL, hearing intact to voice, moist mucous membranes  Neck:  Supple, without masses, thyroid non-tender  Resp:  No accessory muscle use, prolonged expiration  Card:  No murmurs, normal S1, S2 without thrills, 1+ edema  Abd:  Soft, non-tender, non-distended, normoactive bowel sounds are present  Musc:  No cyanosis or clubbing  Skin:  Small area of erythema on right shin  Neuro:  Cranial nerves 3-12 are grossly intact, follows commands appropriately  Psych:  Good insight, oriented to person, place and time, alert    Medications Reviewed: (see below)    Lab Data Reviewed: (see below)    ______________________________________________________________________    Medications:     Current Facility-Administered Medications   Medication Dose Route Frequency    [START ON 2017] doxycycline (VIBRA-TABS) tablet 100 mg  100 mg Oral Q12H    bumetanide (BUMEX) tablet 2 mg  2 mg Oral BID    busPIRone (BUSPAR) tablet 5 mg  5 mg Oral TID    clopidogrel (PLAVIX) tablet 75 mg  75 mg Oral QHS    docusate sodium (COLACE) capsule 100 mg  100 mg Oral BID    cholecalciferol (VITAMIN D3) tablet 1,000 Units  1,000 Units Oral DAILY    DULoxetine (CYMBALTA) capsule 60 mg  60 mg Oral QHS    ferrous sulfate tablet 325 mg  325 mg Oral ACB    gabapentin (NEURONTIN) capsule 600 mg  600 mg Oral QHS    melatonin tablet 10.5 mg  10.5 mg Oral QHS    metoprolol tartrate (LOPRESSOR) tablet 12.5 mg  12.5 mg Oral BID    oxybutynin chloride XL (DITROPAN XL) tablet 10 mg  10 mg Oral QHS    pantoprazole (PROTONIX) tablet 40 mg  40 mg Oral QHS    pramipexole (MIRAPEX) tablet 0.5 mg  0.5 mg Oral QHS    pravastatin (PRAVACHOL) tablet 40 mg  40 mg Oral QHS    ranolazine ER (RANEXA) tablet 1,000 mg  1,000 mg Oral BID    rOPINIRole (REQUIP) tablet 0.25 mg  0.25 mg Oral DAILY PRN    rOPINIRole (REQUIP) tablet 0.25 mg  0.25 mg Oral QHS    traMADol (ULTRAM) tablet 100 mg  100 mg Oral QHS PRN    baclofen (LIORESAL) tablet 5 mg  5 mg Oral BID    calcium-vitamin D (OS-LEON) 500 mg-200 unit tablet  1 Tab Oral BID WITH MEALS    insulin lispro (HUMALOG) injection   SubCUTAneous AC&HS    glucose chewable tablet 16 g  4 Tab Oral PRN    dextrose (D50W) injection syrg 12.5-25 g  12.5-25 g IntraVENous PRN    glucagon (GLUCAGEN) injection 1 mg  1 mg IntraMUSCular PRN    albuterol (PROVENTIL VENTOLIN) nebulizer solution 2.5 mg  2.5 mg Nebulization Q2H PRN    fluticasone-vilanterol (BREO ELLIPTA) 100mcg-25mcg/puff  1 Puff Inhalation DAILY    heparin (porcine) injection 5,000 Units  5,000 Units SubCUTAneous Q8H    methylPREDNISolone (PF) (SOLU-MEDROL) injection 60 mg  60 mg IntraVENous Q8H    cefepime (MAXIPIME) 2 g in 0.9% sodium chloride (MBP/ADV) 100 mL  2 g IntraVENous Q24H    sodium chloride (NS) flush 5-10 mL  5-10 mL IntraVENous Q8H    sodium chloride (NS) flush 5-10 mL  5-10 mL IntraVENous PRN    naloxone (NARCAN) injection 0.4 mg  0.4 mg IntraVENous PRN    lactobac ac& pc-s.therm-b.anim (ROE Q/RISAQUAD)  1 Cap Oral DAILY    albuterol-ipratropium (DUO-NEB) 2.5 MG-0.5 MG/3 ML  3 mL Nebulization Q6H RT     Current Outpatient Prescriptions   Medication Sig    baclofen (LIORESAL) 10 mg tablet Take 10 mg by mouth two (2) times a day.  bumetanide (BUMEX) 2 mg tablet Take 4 mg by mouth two (2) times a day.  cholecalciferol (VITAMIN D3) 1,000 unit tablet Take 1,000 Units by mouth daily.  clopidogrel (PLAVIX) 75 mg tab Take 75 mg by mouth nightly.  docusate sodium (COLACE) 100 mg capsule Take 100 mg by mouth two (2) times a day.  DULoxetine (CYMBALTA) 60 mg capsule Take 60 mg by mouth nightly.  ferrous sulfate 325 mg (65 mg iron) tablet Take 325 mg by mouth Daily (before breakfast).  gabapentin (NEURONTIN) 300 mg capsule Take 600 mg by mouth nightly.  melatonin tab tablet Take 10 mg by mouth nightly.  oxybutynin chloride XL (DITROPAN XL) 10 mg CR tablet Take 10 mg by mouth nightly.  pantoprazole (PROTONIX) 40 mg tablet Take 40 mg by mouth nightly.  potassium chloride (K-DUR, KLOR-CON) 20 mEq tablet Take 20 mEq by mouth daily.  pramipexole (MIRAPEX) 0.5 mg tablet Take 0.5 mg by mouth nightly.  pravastatin (PRAVACHOL) 40 mg tablet Take 40 mg by mouth nightly.  ranolazine ER (RANEXA) 500 mg SR tablet Take 1,000 mg by mouth two (2) times a day.  rOPINIRole (REQUIP) 0.25 mg tablet Take 0.25 mg by mouth daily as needed. In addition to one tablet nightly    spironolactone (ALDACTONE) 50 mg tablet Take 50 mg by mouth every Monday, Wednesday, Friday.  traMADol (ULTRAM) 50 mg tablet Take 100 mg by mouth nightly.  metoprolol tartrate (LOPRESSOR) 25 mg tablet Take 12.5 mg by mouth two (2) times a day.  rOPINIRole (REQUIP) 0.25 mg tablet Take 0.25 mg by mouth nightly.     busPIRone (BUSPAR) 5 mg tablet Take 5 mg by mouth three (3) times daily as needed.  calcium carbonate-vitamin d2 500 mg(1,250mg) -200 unit tab Take 1 Tab by mouth two (2) times a day. Lab Review:     Recent Labs      06/28/17 0429 06/27/17 2056   WBC  9.4  13.1*   HGB  9.7*  10.2*   HCT  29.0*  31.3*   PLT  218  238     Recent Labs      06/28/17 0429 06/27/17 2056   NA  138  139   K  3.8  4.0   CL  101  101   CO2  31  33*   GLU  215*  184*   BUN  28*  29*   CREA  1.88*  2.09*   CA  8.6  8.6   ALB  3.0*  3.3*   TBILI  0.3  0.3   SGOT  15  17   ALT  21  22   INR   --   1.0     Lab Results   Component Value Date/Time    Glucose (POC) 226 06/28/2017 07:08 AM          Assessment / Plan: Active Problems:    59 yo hx of HTN, DM, COPD on 3L O2, dCHF, CKD 3, presented w/ dyspnea, COPD, SILVIA    1) Acute on chronic respiratory failure with hypoxemia/COPD exacerbation: on chronic 3L O2. No evidence of CHF. Cont duo nebs, IV solumedrol, LABA/ICS. Pulm to eval    2) SILVIA/CKD 3: could be volume depletion from diuretics. Bumex decreased. Consult Renal, monitor BMP    3) UTI: recurrent. Will monitor urine Cx. Start IV Cefepime (patient stated that she was on this in the past)    4) RLE cellulitis: mild. Cont Doxy    5) CAD/dCHF: volume stable. Cont plavix, BB, statin. Bumex decreased due to SILVIA    6) DM type 2 w/ renal complications: V1S pending.   Cont SSI    7) ALLY: cont CPAP    8) Morbid obesity: counseled on wt loss    Total time spent with patient: 36 [address] discussed with: Patient, nursing    Discussed:  Care Plan    Prophylaxis:  Hep SQ    Disposition:  Home w/Family           ___________________________________________________    Attending Physician: Jose Alfredo Walls MD

## 2017-06-28 NOTE — CONSULTS
[facility] [place] [facility]   [address] [facility], [address]   [address] [facility]       Name:  Thelma Da Silva   MR#:  [id number]   :  1960   Account #:  [de-identified]    Date of Consultation:  2017   Date of Adm:  2017       REQUESTING PHYSICIAN: Otis LEWIS Do, MD    CHIEF COMPLAINT: Elevated creatinine. HISTORY OF PRESENT ILLNESS: The patient is a 58-year-old white   female with numerous chronic medical problems. She has heart failure   with preserved ejection fraction and is on very high dose diuretics, 4   mg b.i.d. of Bumex along with Aldactone. She has COPD and is an   active smoker. She has obstructive sleep apnea and is on BiPAP. She   wears oxygen at home. She has a history of pituitary adenoma and   central diabetes insipidus. She tells me that her kidneys frequently \"dry   out\" and she has recently started seeing a nephrologist at [facility]. The   patient was admitted to the hospital with shortness of breath. Her   creatinine was 2.09. Her dose of Bumex was reduced by 50% and the   creatinine this morning is 1.88. Her breathing is better. REVIEW OF SYSTEMS   CONSTITUTIONAL: No fever or chills. GI: No nausea, vomiting or diarrhea. CARDIOVASCULAR: No chest pain. Improved shortness of breath. Positive for chronic lower extremity edema. All other systems reviewed and are negative except as per history of   present illness. PAST MEDICAL HISTORY: Chronic kidney disease, question stage 3,   probably multifactorial, hypertension, diabetes mellitus and diabetes   insipidus, COPD, tobacco abuse, obstructive sleep apnea, coronary   artery disease, status post CAGB. Carotid artery disease. FAMILY HISTORY: No known family history of renal disease. SOCIAL HISTORY: She lives with her mother. She is an active   smoker. PHYSICAL EXAMINATION   VITAL SIGNS: Temperature 97.6, pulse 100, blood pressure 131/68. GENERAL: Obese, chronically ill white female. No acute distress. HEENT: Eyes are anicteric. Extraocular movements are intact. ENMT:   Mucous membranes are moist. There is no epistaxis. NECK: Nontender with no masses. Thyroid is not enlarged. CARDIOVASCULAR: Heart is regular. There is trace lower extremity   edema. RESPIRATORY: Lungs reveal diminished breath sounds throughout. No crackles or rhonchi. GI: Abdomen is obese and nontender. Bowel sounds are active. Hepatosplenomegaly is not appreciated. SKIN: Warm with normal turgor. There is no rash. MUSCULOSKELETAL: There is no cyanosis or clubbing. There is no   synovitis of the fingers or wrists bilaterally. LABORATORY DATA: Sodium 138, potassium 3.8, chloride 101,   bicarb 31, BUN 28, creatinine 1.88.     ASSESSMENT: Acute renal failure with presumably underlying chronic   kidney disease. Likely etiology of acute kidney dysfunction is recurrent   volume depletion related to her complex volume status and high dose   diuretics. She does appear to be improving. She presented with   dyspnea, which is felt to be less likely due to heart failure and volume   than to chronic obstructive pulmonary disease and underlying lung   disease. PLAN   1. Agree with reduced Bumex dose. 2. Per the patient, she has diabetes insipidus. I will send a urine   osmolality, nonetheless. Impossible to confirm this diagnosis without   water restriction testing, which I do not intend to do. I would not place   the patient on a fluid restriction. 3. Avoid nephrotoxins. 4. Monitor serial labs. 5. Follow up with [facility] after discharge.               MD ZORA Hilario / Malini.[place]   D:  06/28/2017   12:05   T:  06/28/2017   15:04   Job #:  [id number]

## 2017-06-28 NOTE — ED NOTES
Bedside and Verbal shift change report given to jeb alaniz (oncoming nurse) by Echo Adame RN (offgoing nurse). Report given with SBAR, ED Summary and MAR.

## 2017-06-28 NOTE — ED NOTES
Bedside and Verbal shift change report given to Mikel Betancourt (oncoming nurse) by Duncan Mcdonald RN (offgoing nurse). Report included the following information SBAR, ED Summary, Intake/Output, MAR and Recent Results. Introduced self to pt as primary RN and explained plan of care from this point on. Pt had removed herself from BiPAP prior to this RN entering room. Pt remains on 4 lts O2 via NC and appears to be tolerating well. Pt's sats remain in the mid 90s at this time. Pt alert and oriented x4. Denies any distress at this time. SCDs in place on bilateral lower legs at this time as well. Call bell at hand.

## 2017-06-28 NOTE — ED PROVIDER NOTES
HPI Comments: 60 y.o. female with past medical history significant for cellulitis, COPD, congestive heart failure, who presents via EMS with chief complaint of SOB. Pt reports onset of SOB 3 days PTA that worsens with exertion and makes it difficult to walk from \"the kitchen table to the sink\". At home pt is currently on 4L of O2 via nasal cannula and is on BiPAP at night. Pt reports she doesn't give herself breathing treatments because it induces a \"severe cough\". Pt also reports recent increase in leg swelling in addition to an area of redness and blistering of the RLE. Pt also reports \"really bad shakes\" that began a week PTA. Pt recently started taking gabapentin 2 weeks ago. Pt denies CP. There are no other acute medical concerns at this time. Pt receives all care from [facility]. Cardiologist: Celestino Farooq MD    Note written by Nahid Welch, as dictated by Suzy Hopkins MD 8:20 PM      The history is provided by the patient. No  was used. No past medical history on file. No past surgical history on file. No family history on file. Social History     Social History    Marital status:      Spouse name: N/A    Number of children: N/A    Years of education: N/A     Occupational History    Not on file. Social History Main Topics    Smoking status: Not on file    Smokeless tobacco: Not on file    Alcohol use Not on file    Drug use: Not on file    Sexual activity: Not on file     Other Topics Concern    Not on file     Social History Narrative         ALLERGIES: Adhesive tape-silicones and Tapentadol    Review of Systems   HENT: Negative. Respiratory: Positive for shortness of breath. Cardiovascular: Positive for leg swelling. Negative for chest pain. Genitourinary: Negative. Skin:        Area of redness with blistering of the RLE. Neurological: Negative. Psychiatric/Behavioral: Negative.     All other systems reviewed and are negative. There were no vitals filed for this visit. Physical Exam   Constitutional: She is oriented to person, place, and time. No distress. Morbid obesity   HENT:   Head: Normocephalic and atraumatic. Eyes: Conjunctivae are normal. No scleral icterus. Neck: Neck supple. No tracheal deviation present. Cardiovascular: Normal rate, regular rhythm, normal heart sounds and intact distal pulses. Exam reveals no gallop and no friction rub. No murmur heard. Pulmonary/Chest: She has wheezes (bilateral expiratory). Appears to be in mild respiratory distress  98% on O2  Good air movement   Abdominal: Soft. She exhibits no distension. There is no tenderness. There is no rebound and no guarding. Musculoskeletal: She exhibits edema (2+ pretibial). Neurological: She is alert and oriented to person, place, and time. Skin: Skin is warm and dry. No rash noted. Psychiatric: She has a normal mood and affect. Nursing note and vitals reviewed. Note written by Natalio Roger. Mike Valerio, as dictated by Courtney Ferreira MD 8:20 PM      MDM  Number of Diagnoses or Management Options  Chronic obstructive pulmonary disease, unspecified COPD type [facility]):      Amount and/or Complexity of Data Reviewed  Clinical lab tests: ordered and reviewed  Tests in the radiology section of CPT®: ordered and reviewed  Tests in the medicine section of CPT®: ordered and reviewed  Discussion of test results with the performing providers: yes  Obtain history from someone other than the patient: yes  Discuss the patient with other providers: yes      ED Course       Procedures     PROGRESS NOTE:  8:20 PM  Declined breathing treatment. ED EKG interpretation:  Rhythm: normal sinus rhythm and biatrial enlargement; and regular . Rate (approx.): 93;  Note written by Dicnasra Payment.  Mike Valerio, as dictated by Courtney Ferreira MD 8:12 PM    PROGRESS NOTE:  10:23 PM  Pt has acute COPD, also has deterioration renal function, uncertain of baseline. DDimer slightly elevated. Needs VQ scan. Plan to admit to hospitalist.     10:25 PM  Patient is being admitted to the hospital.  The results of their tests and reasons for their admission have been discussed with them and/or available family. They convey agreement and understanding for the need to be admitted and for their admission diagnosis. Consultation will be made now with the inpatient physician for hospitalization. CONSULT NOTE:  10:25 PM Naomy Nieves MD spoke with Dr. Mario Laguerre, Consult for Hospitalist.  Discussed available diagnostic tests and clinical findings. He is in agreement with care plans as outlined. Dr. Mario Laguerre will see the pt.

## 2017-06-28 NOTE — ROUTINE PROCESS
TRANSFER - IN REPORT:    Verbal report received from [facility]) on Jorge Bath  being received from ED(unit) for routine progression of care      Report consisted of patients Situation, Background, Assessment and   Recommendations(SBAR). Information from the following report(s) SBAR, Intake/Output and MAR was reviewed with the receiving nurse. Opportunity for questions and clarification was provided. Assessment completed upon patients arrival to unit and care assumed.      Primary Nurse Luis Felipe Pan and Erinn Escobedo RN performed a dual skin assessment on this patient Impairment noted- see wound doc flow sheet  Kevon score is 21

## 2017-06-29 ENCOUNTER — HOME HEALTH ADMISSION (OUTPATIENT)
Dept: HOME HEALTH SERVICES | Facility: HOME HEALTH | Age: 57
End: 2017-06-29

## 2017-06-29 VITALS
BODY MASS INDEX: 51.41 KG/M2 | SYSTOLIC BLOOD PRESSURE: 113 MMHG | HEART RATE: 72 BPM | DIASTOLIC BLOOD PRESSURE: 53 MMHG | WEIGHT: 290.13 LBS | TEMPERATURE: 98.2 F | RESPIRATION RATE: 18 BRPM | OXYGEN SATURATION: 94 % | HEIGHT: 63 IN

## 2017-06-29 PROBLEM — N39.0 UTI (URINARY TRACT INFECTION): Status: ACTIVE | Noted: 2017-06-29

## 2017-06-29 LAB
ANION GAP BLD CALC-SCNC: 8 MMOL/L (ref 5–15)
BUN SERPL-MCNC: 41 MG/DL (ref 6–20)
BUN/CREAT SERPL: 23 (ref 12–20)
CALCIUM SERPL-MCNC: 8.7 MG/DL (ref 8.5–10.1)
CHLORIDE SERPL-SCNC: 100 MMOL/L (ref 97–108)
CO2 SERPL-SCNC: 30 MMOL/L (ref 21–32)
CREAT SERPL-MCNC: 1.81 MG/DL (ref 0.55–1.02)
ERYTHROCYTE [DISTWIDTH] IN BLOOD BY AUTOMATED COUNT: 14.2 % (ref 11.5–14.5)
EST. AVERAGE GLUCOSE BLD GHB EST-MCNC: 123 MG/DL
GLUCOSE BLD STRIP.AUTO-MCNC: 212 MG/DL (ref 65–100)
GLUCOSE BLD STRIP.AUTO-MCNC: 297 MG/DL (ref 65–100)
GLUCOSE SERPL-MCNC: 205 MG/DL (ref 65–100)
HBA1C MFR BLD: 5.9 % (ref 4.2–6.3)
HCT VFR BLD AUTO: 28.9 % (ref 35–47)
HGB BLD-MCNC: 9.2 G/DL (ref 11.5–16)
MAGNESIUM SERPL-MCNC: 2.3 MG/DL (ref 1.6–2.4)
MCH RBC QN AUTO: 31.1 PG (ref 26–34)
MCHC RBC AUTO-ENTMCNC: 31.8 G/DL (ref 30–36.5)
MCV RBC AUTO: 97.6 FL (ref 80–99)
PHOSPHATE SERPL-MCNC: 3.7 MG/DL (ref 2.6–4.7)
PLATELET # BLD AUTO: 228 K/UL (ref 150–400)
POTASSIUM SERPL-SCNC: 4.4 MMOL/L (ref 3.5–5.1)
RBC # BLD AUTO: 2.96 M/UL (ref 3.8–5.2)
SERVICE CMNT-IMP: ABNORMAL
SERVICE CMNT-IMP: ABNORMAL
SODIUM SERPL-SCNC: 138 MMOL/L (ref 136–145)
WBC # BLD AUTO: 14.4 K/UL (ref 3.6–11)

## 2017-06-29 PROCEDURE — 74011000250 HC RX REV CODE- 250: Performed by: INTERNAL MEDICINE

## 2017-06-29 PROCEDURE — 74011636637 HC RX REV CODE- 636/637: Performed by: INTERNAL MEDICINE

## 2017-06-29 PROCEDURE — 82962 GLUCOSE BLOOD TEST: CPT

## 2017-06-29 PROCEDURE — 85027 COMPLETE CBC AUTOMATED: CPT | Performed by: INTERNAL MEDICINE

## 2017-06-29 PROCEDURE — 94660 CPAP INITIATION&MGMT: CPT

## 2017-06-29 PROCEDURE — 84100 ASSAY OF PHOSPHORUS: CPT | Performed by: INTERNAL MEDICINE

## 2017-06-29 PROCEDURE — 83036 HEMOGLOBIN GLYCOSYLATED A1C: CPT | Performed by: INTERNAL MEDICINE

## 2017-06-29 PROCEDURE — 36415 COLL VENOUS BLD VENIPUNCTURE: CPT | Performed by: INTERNAL MEDICINE

## 2017-06-29 PROCEDURE — 94640 AIRWAY INHALATION TREATMENT: CPT

## 2017-06-29 PROCEDURE — 83735 ASSAY OF MAGNESIUM: CPT | Performed by: INTERNAL MEDICINE

## 2017-06-29 PROCEDURE — 74011250637 HC RX REV CODE- 250/637: Performed by: INTERNAL MEDICINE

## 2017-06-29 PROCEDURE — 77010033678 HC OXYGEN DAILY

## 2017-06-29 PROCEDURE — 74011250636 HC RX REV CODE- 250/636: Performed by: INTERNAL MEDICINE

## 2017-06-29 PROCEDURE — 74011000258 HC RX REV CODE- 258: Performed by: INTERNAL MEDICINE

## 2017-06-29 PROCEDURE — 80048 BASIC METABOLIC PNL TOTAL CA: CPT | Performed by: INTERNAL MEDICINE

## 2017-06-29 RX ORDER — BUMETANIDE 2 MG/1
2 TABLET ORAL 2 TIMES DAILY
Qty: 60 TAB | Refills: 0 | Status: SHIPPED | OUTPATIENT
Start: 2017-06-29

## 2017-06-29 RX ORDER — CEFDINIR 300 MG/1
300 CAPSULE ORAL 2 TIMES DAILY
Qty: 10 CAP | Refills: 0 | Status: SHIPPED | OUTPATIENT
Start: 2017-06-29 | End: 2017-07-04

## 2017-06-29 RX ORDER — DOXYCYCLINE HYCLATE 100 MG
100 TABLET ORAL EVERY 12 HOURS
Qty: 10 TAB | Refills: 0 | Status: SHIPPED | OUTPATIENT
Start: 2017-06-29 | End: 2017-07-04

## 2017-06-29 RX ORDER — PREDNISONE 10 MG/1
TABLET ORAL
Qty: 30 TAB | Refills: 0 | Status: SHIPPED | OUTPATIENT
Start: 2017-06-29

## 2017-06-29 RX ADMIN — DOCUSATE SODIUM 100 MG: 100 CAPSULE ORAL at 08:04

## 2017-06-29 RX ADMIN — DOXYCYCLINE HYCLATE 100 MG: 100 TABLET, COATED ORAL at 06:00

## 2017-06-29 RX ADMIN — METOPROLOL TARTRATE 12.5 MG: 25 TABLET ORAL at 08:04

## 2017-06-29 RX ADMIN — BACLOFEN 5 MG: 10 TABLET ORAL at 08:04

## 2017-06-29 RX ADMIN — BUMETANIDE 2 MG: 1 TABLET ORAL at 08:04

## 2017-06-29 RX ADMIN — INSULIN LISPRO 5 UNITS: 100 INJECTION, SOLUTION INTRAVENOUS; SUBCUTANEOUS at 11:27

## 2017-06-29 RX ADMIN — Medication 1 CAPSULE: at 08:04

## 2017-06-29 RX ADMIN — VITAMIN D, TAB 1000IU (100/BT) 1000 UNITS: 25 TAB at 08:04

## 2017-06-29 RX ADMIN — BUSPIRONE HYDROCHLORIDE 5 MG: 5 TABLET ORAL at 08:04

## 2017-06-29 RX ADMIN — IPRATROPIUM BROMIDE AND ALBUTEROL SULFATE 3 ML: .5; 3 SOLUTION RESPIRATORY (INHALATION) at 00:26

## 2017-06-29 RX ADMIN — CEFEPIME 2 G: 2 INJECTION, POWDER, FOR SOLUTION INTRAVENOUS at 09:45

## 2017-06-29 RX ADMIN — RANOLAZINE 1000 MG: 500 TABLET, FILM COATED, EXTENDED RELEASE ORAL at 08:05

## 2017-06-29 RX ADMIN — METHYLPREDNISOLONE SODIUM SUCCINATE 60 MG: 125 INJECTION, POWDER, FOR SOLUTION INTRAMUSCULAR; INTRAVENOUS at 04:48

## 2017-06-29 RX ADMIN — Medication 10 ML: at 06:00

## 2017-06-29 RX ADMIN — FERROUS SULFATE TAB 325 MG (65 MG ELEMENTAL FE) 325 MG: 325 (65 FE) TAB at 06:51

## 2017-06-29 RX ADMIN — INSULIN LISPRO 213 UNITS: 100 INJECTION, SOLUTION INTRAVENOUS; SUBCUTANEOUS at 08:06

## 2017-06-29 RX ADMIN — OYSTER SHELL CALCIUM WITH VITAMIN D 1 TABLET: 500; 200 TABLET, FILM COATED ORAL at 08:04

## 2017-06-29 RX ADMIN — METHYLPREDNISOLONE SODIUM SUCCINATE 60 MG: 125 INJECTION, POWDER, FOR SOLUTION INTRAMUSCULAR; INTRAVENOUS at 06:00

## 2017-06-29 RX ADMIN — Medication 10 ML: at 04:49

## 2017-06-29 NOTE — PROGRESS NOTES
Patient does not want or feel she needs a DuoNeb jet neb tx this morning--complains that post tx she has \"coughing fits\". Patient just completed breakfast and is resting comfortably on 2L NC. SpO2 96%. Patient is aware to call for breathing treatment if needed and aware that she is scheduled Q8 with next scheduled time around 1600.

## 2017-06-29 NOTE — DISCHARGE INSTRUCTIONS
HOSPITALIST DISCHARGE INSTRUCTIONS  NAME: Santosh Bryant   :  1960   MRN:  [id number]     Date/Time:  2017 11:36 AM    ADMIT DATE: 2017     DISCHARGE DATE: 2017     ADMITTING DIAGNOSIS:  COPD, acute kidney injury, UTI    DISCHARGE DIAGNOSIS:  same    MEDICATIONS:  See after visit summary       · It is important that you take the medication exactly as they are prescribed. · Keep your medication in the bottles provided by the pharmacist and keep a list of the medication names, dosages, and times to be taken in your wallet. · Do not take other medications without consulting your doctor     Pain Management: per above medications    What to do at Home    Recommended diet:  Diabetic Diet    Recommended activity: Activity as tolerated    1) Return to the hospital if you feel worse    2) If you experience any of the following symptoms then please call your primary care physician or return to the emergency room if you cannot get hold of your doctor:  Fever, chills, nausea, vomiting, diarrhea, change in mentation, falling, bleeding, shortness of breath, chest pain, severe headache, severe abdominal pain,     3) Please decrease your bumex dose as instructed    4) Take antibiotics for urine infection    5) Take prednisone as prescribed. Follow up with your lung doctors    6) Resume your home oxygen    Follow Up: Follow-up Information     Follow up With Details Comments [address], [place] Schedule an appointment as soon as possible for a visit in 1 week  [facility] [address] [facility] Karyna Brooks MD Schedule an appointment as soon as possible for a visit in 1 week  [address] [facility] [address]  [phone]              Information obtained by :  I understand that if any problems occur once I am at home I am to contact my physician. I understand and acknowledge receipt of the instructions indicated above. Physician's or R.N.'s Signature                                                                  Date/Time                                                                                                                                              Patient or Representative Signature                                                          Date/Time     Chronic Obstructive Pulmonary Disease (COPD) Flare-Ups: Care Instructions  Your Care Instructions    Chronic obstructive pulmonary disease (COPD) is a lung disease that makes it hard to breathe. It is caused by damage to the lungs over many years, usually from smoking. COPD is often a mix of two diseases:  · Chronic bronchitis: The airways that carry air to the lungs (bronchial tubes) get inflamed and make a lot of mucus. This can narrow or block the airways. · Emphysema: In a healthy person, the tiny air sacs in the lungs are like balloons. As you breathe in and out, they get bigger and smaller to move air through your lungs. But with emphysema, these air sacs are damaged and lose their stretch. Less air gets in and out of the lungs. Many people with COPD have attacks called flare-ups or exacerbations. This is when your usual symptoms quickly get worse and stay worse. The doctor has checked you carefully. But problems can develop later. If you notice any problems or new symptoms, get medical treatment right away. Follow-up care is a key part of your treatment and safety. Be sure to make and go to all appointments, and call your doctor if you are having problems. It's also a good idea to know your test results and keep a list of the medicines you take. How can you care for yourself at home? · Be safe with medicines. Take your medicines exactly as prescribed. Call your doctor if you think you are having a problem with your medicine.  You may be taking medicines such as:  ¨ Bronchodilators. These help open your airways and make breathing easier. ¨ Corticosteroids. These reduce airway inflammation. They may be given as pills, in a vein, or in an inhaled form. You may go home with pills in addition to an inhaler that you already use. · A spacer may help you get more inhaled medicine to your lungs. Ask your doctor or pharmacist if a spacer is right for you. If it is, ask how to use it properly. · If your doctor prescribed antibiotics, take them as directed. Do not stop taking them just because you feel better. You need to take the full course of antibiotics. · If your doctor prescribed oxygen, use the flow rate your doctor has recommended. Do not change it without talking to your doctor first.  · Do not smoke. Smoking makes COPD worse. If you need help quitting, talk to your doctor about stop-smoking programs and medicines. These can increase your chances of quitting for good. When should you call for help? Call 911 anytime you think you may need emergency care. For example, call if:  · You have severe trouble breathing. Call your doctor now or seek immediate medical care if:  · You have new or worse trouble breathing. · Your coughing or wheezing gets worse. · You cough up dark brown or bloody mucus (sputum). · You have a new or higher fever. Watch closely for changes in your health, and be sure to contact your doctor if:  · You notice more mucus or a change in the color of your mucus. · You need to use your antibiotic or steroid pills. · You do not get better as expected. Where can you learn more? Go to http://breanna-katy.info/. Enter L024 in the search box to learn more about \"Chronic Obstructive Pulmonary Disease (COPD) Flare-Ups: Care Instructions. \"  Current as of: March 25, 2017  Content Version: 11.3  © 4498-8584 [employer].  Care instructions adapted under license by [employer] (which disclaims liability or warranty for this information). If you have questions about a medical condition or this instruction, always ask your healthcare professional. [address] any warranty or liability for your use of this information. Stopping Smoking: Care Instructions  Your Care Instructions  Cigarette smokers crave the nicotine in cigarettes. Giving it up is much harder than simply changing a habit. Your body has to stop craving the nicotine. It is hard to quit, but you can do it. There are many tools that people use to quit smoking. You may find that combining tools works best for you. There are several steps to quitting. First you get ready to quit. Then you get support to help you. After that, you learn new skills and behaviors to become a nonsmoker. For many people, a necessary step is getting and using medicine. Your doctor will help you set up the plan that best meets your needs. You may want to attend a smoking cessation program to help you quit smoking. When you choose a program, look for one that has proven success. Ask your doctor for ideas. You will greatly increase your chances of success if you take medicine as well as get counseling or join a cessation program.  Some of the changes you feel when you first quit tobacco are uncomfortable. Your body will miss the nicotine at first, and you may feel short-tempered and grumpy. You may have trouble sleeping or concentrating. Medicine can help you deal with these symptoms. You may struggle with changing your smoking habits and rituals. The last step is the tricky one: Be prepared for the smoking urge to continue for a time. This is a lot to deal with, but keep at it. You will feel better. Follow-up care is a key part of your treatment and safety. Be sure to make and go to all appointments, and call your doctor if you are having problems. Its also a good idea to know your test results and keep a list of the medicines you take.   How can you care for yourself at home? · Ask your family, friends, and coworkers for support. You have a better chance of quitting if you have help and support. · Join a support group, such as Nicotine Anonymous, for people who are trying to quit smoking. · Consider signing up for a smoking cessation program, such as the American Lung Association's Freedom from Smoking program.  · Set a quit date. Pick your date carefully so that it is not right in the middle of a big deadline or stressful time. Once you quit, do not even take a puff. Get rid of all ashtrays and lighters after your last cigarette. Clean your house and your clothes so that they do not smell of smoke. · Learn how to be a nonsmoker. Think about ways you can avoid those things that make you reach for a cigarette. ¨ Avoid situations that put you at greatest risk for smoking. For some people, it is hard to have a drink with friends without smoking. For others, they might skip a coffee break with coworkers who smoke. ¨ Change your daily routine. Take a different route to work or eat a meal in a different place. · Cut down on stress. Calm yourself or release tension by doing an activity you enjoy, such as reading a book, taking a hot bath, or gardening. · Talk to your doctor or pharmacist about nicotine replacement therapy, which replaces the nicotine in your body. You still get nicotine but you do not use tobacco. Nicotine replacement products help you slowly reduce the amount of nicotine you need. These products come in several forms, many of them available over-the-counter:  ¨ Nicotine patches  ¨ Nicotine gum and lozenges  ¨ Nicotine inhaler  · Ask your doctor about bupropion (Wellbutrin) or varenicline (Chantix), which are prescription medicines. They do not contain nicotine. They help you by reducing withdrawal symptoms, such as stress and anxiety. · Some people find hypnosis, acupuncture, and massage helpful for ending the smoking habit.   · Eat a healthy diet and get regular exercise. Having healthy habits will help your body move past its craving for nicotine. · Be prepared to keep trying. Most people are not successful the first few times they try to quit. Do not get mad at yourself if you smoke again. Make a list of things you learned and think about when you want to try again, such as next week, next month, or next year. Where can you learn more? Go to http://breanna-katy.info/. Enter I532 in the search box to learn more about \"Stopping Smoking: Care Instructions. \"  Current as of: March 20, 2017  Content Version: 11.3  © 2156-1433 [employer]. Care instructions adapted under license by [employer] (which disclaims liability or warranty for this information). If you have questions about a medical condition or this instruction, always ask your healthcare professional. [place] [address] any warranty or liability for your use of this information. Urinary Tract Infection in Women: Care Instructions  Your Care Instructions    A urinary tract infection, or UTI, is a general term for an infection anywhere between the kidneys and the urethra (where urine comes out). Most UTIs are bladder infections. They often cause pain or burning when you urinate. UTIs are caused by bacteria and can be cured with antibiotics. Be sure to complete your treatment so that the infection goes away. Follow-up care is a key part of your treatment and safety. Be sure to make and go to all appointments, and call your doctor if you are having problems. It's also a good idea to know your test results and keep a list of the medicines you take. How can you care for yourself at home? · Take your antibiotics as directed. Do not stop taking them just because you feel better. You need to take the full course of antibiotics. · Drink extra water and other fluids for the next day or two.  This may help wash out the bacteria that are causing the infection. (If you have kidney, heart, or liver disease and have to limit fluids, talk with your doctor before you increase your fluid intake.)  · Avoid drinks that are carbonated or have caffeine. They can irritate the bladder. · Urinate often. Try to empty your bladder each time. · To relieve pain, take a hot bath or lay a heating pad set on low over your lower belly or genital area. Never go to sleep with a heating pad in place. To prevent UTIs  · Drink plenty of water each day. This helps you urinate often, which clears bacteria from your system. (If you have kidney, heart, or liver disease and have to limit fluids, talk with your doctor before you increase your fluid intake.)  · Urinate when you need to. · Urinate right after you have sex. · Change sanitary pads often. · Avoid douches, bubble baths, feminine hygiene sprays, and other feminine hygiene products that have deodorants. · After going to the bathroom, wipe from front to back. When should you call for help? Call your doctor now or seek immediate medical care if:  · Symptoms such as fever, chills, nausea, or vomiting get worse or appear for the first time. · You have new pain in your back just below your rib cage. This is called flank pain. · There is new blood or pus in your urine. · You have any problems with your antibiotic medicine. Watch closely for changes in your health, and be sure to contact your doctor if:  · You are not getting better after taking an antibiotic for 2 days. · Your symptoms go away but then come back. Where can you learn more? Go to http://breanna-katy.info/. Enter X955 in the search box to learn more about \"Urinary Tract Infection in Women: Care Instructions. \"  Current as of: November 28, 2016  Content Version: 11.3  © 1069-7221 [employer].  Care instructions adapted under license by [employer] (which disclaims liability or warranty for this information). If you have questions about a medical condition or this instruction, always ask your healthcare professional. [place] [address] any warranty or liability for your use of this information.

## 2017-06-29 NOTE — PROGRESS NOTES
6/29/2017   1:14 PM  [address] can see Pt for H2 H visit Monday 7/3/17. Nursing notified. Vashti Bauer  Case Management    11:40 AM  Nursing notified me of D/C for today. EMR reviewed, I spoke w/ MD  [facility] for [facility] visit, he is in agreement. I scheduled PCP F/U, Met w/ Pt I gave her list of [facility] PCP's as Pt is desiring to change; we discussed [facility] visit, she agreed; Pt has family to transport home.   Vashti Bauer  Case Management

## 2017-06-29 NOTE — PROGRESS NOTES
Name: Lion: [address]   : 1960 Admit Date: 2017   Phone: [phone]  Room: [facility]/   PCP: Purvi Burciaga MD  MRN: [id number]   Date: 2017  Code: Full Code          Chart and notes reviewed. Data reviewed. I review the patient's current medications in the medical record at each encounter. I have evaluated and examined the patient. Overnight events  Afebrile  Sats 95% on 3L  Slept with BiPAP overnight  WBC 14.4 - likely due to steroids  Hgb 9.2  Creat 1.81 - improving  Urine cx with GNR  BLE VD negative for DVT    ROS: Feeling better this morning and feels about back to baseline. SOB improved. Has cough due to nebs. Denies CP. Denies fever or chills. Denies abd pain or LE pain. Has mild LE edema.       Current Facility-Administered Medications   Medication Dose Route Frequency    doxycycline (VIBRA-TABS) tablet 100 mg  100 mg Oral Q12H    bumetanide (BUMEX) tablet 2 mg  2 mg Oral BID    busPIRone (BUSPAR) tablet 5 mg  5 mg Oral TID    clopidogrel (PLAVIX) tablet 75 mg  75 mg Oral QHS    docusate sodium (COLACE) capsule 100 mg  100 mg Oral BID    cholecalciferol (VITAMIN D3) tablet 1,000 Units  1,000 Units Oral DAILY    DULoxetine (CYMBALTA) capsule 60 mg  60 mg Oral QHS    ferrous sulfate tablet 325 mg  325 mg Oral ACB    gabapentin (NEURONTIN) capsule 600 mg  600 mg Oral QHS    melatonin tablet 10.5 mg  10.5 mg Oral QHS    metoprolol tartrate (LOPRESSOR) tablet 12.5 mg  12.5 mg Oral BID    oxybutynin chloride XL (DITROPAN XL) tablet 10 mg  10 mg Oral QHS    pantoprazole (PROTONIX) tablet 40 mg  40 mg Oral QHS    pramipexole (MIRAPEX) tablet 0.5 mg  0.5 mg Oral QHS    pravastatin (PRAVACHOL) tablet 40 mg  40 mg Oral QHS    ranolazine ER (RANEXA) tablet 1,000 mg  1,000 mg Oral BID    rOPINIRole (REQUIP) tablet 0.25 mg  0.25 mg Oral DAILY PRN    rOPINIRole (REQUIP) tablet 0.25 mg  0.25 mg Oral QHS    traMADol (ULTRAM) tablet 100 mg 100 mg Oral QHS PRN    baclofen (LIORESAL) tablet 5 mg  5 mg Oral BID    calcium-vitamin D (OS-LEON) 500 mg-200 unit tablet  1 Tab Oral BID WITH MEALS    insulin lispro (HUMALOG) injection   SubCUTAneous AC&HS    glucose chewable tablet 16 g  4 Tab Oral PRN    dextrose (D50W) injection syrg 12.5-25 g  12.5-25 g IntraVENous PRN    glucagon (GLUCAGEN) injection 1 mg  1 mg IntraMUSCular PRN    albuterol (PROVENTIL VENTOLIN) nebulizer solution 2.5 mg  2.5 mg Nebulization Q2H PRN    heparin (porcine) injection 5,000 Units  5,000 Units SubCUTAneous Q8H    methylPREDNISolone (PF) (SOLU-MEDROL) injection 60 mg  60 mg IntraVENous Q8H    cefepime (MAXIPIME) 2 g in 0.9% sodium chloride (MBP/ADV) 100 mL  2 g IntraVENous Q24H    albuterol-ipratropium (DUO-NEB) 2.5 MG-0.5 MG/3 ML  3 mL Nebulization Q8H RT    sodium chloride (NS) flush 5-10 mL  5-10 mL IntraVENous Q8H    sodium chloride (NS) flush 5-10 mL  5-10 mL IntraVENous PRN    naloxone (NARCAN) injection 0.4 mg  0.4 mg IntraVENous PRN    lactobac ac& pc-s.therm-b.anim (ROE Q/RISAQUAD)  1 Cap Oral DAILY         REVIEW OF SYSTEMS   Negative except as stated in the HPI. Physical Exam:   Visit Vitals    /75 (BP 1 Location: Right arm, BP Patient Position: Sitting)    Pulse 87    Temp 97.8 °F (36.6 °C)    Resp 18    Ht 5' 3\" (1.6 m)    Wt 131.6 kg (290 lb 2 oz)    SpO2 95%    Breastfeeding No    BMI 51.39 kg/m2     General:  Alert, cooperative, no distress, appears stated age. Head:  Normocephalic, without obvious abnormality, atraumatic. Eyes:  Conjunctivae/corneas clear. Nose: Nares normal. Septum midline. Mucosa normal.    Throat: Lips, mucosa, and tongue normal. Class 3 airway. Neck: Thick, supple, symmetrical, trachea midline, no adenopathy. Lungs:   Diminished breath sounds, no wheeze or rales appreciated. Air movement mildly improved. Chest wall:  No tenderness or deformity.    Heart:  Regular rate and rhythm, S1, S2 normal, no murmur, click, rub or gallop. Abdomen:   Obese, soft, non-tender. Bowel sounds normal. No masses,  No organomegaly. Extremities: Extremities normal, atraumatic, no cyanosis, trace LE edema. Pulses: 2+ and symmetric all extremities. Skin: Skin color, texture, turgor normal. No rashes. Small blister with surrounding erythema on right anterior tibia. Lymph nodes: Cervical, supraclavicular nodes normal.   Neurologic: Grossly nonfocal       Lab Results   Component Value Date/Time    Sodium 138 06/29/2017 05:23 AM    Potassium 4.4 06/29/2017 05:23 AM    Chloride 100 06/29/2017 05:23 AM    CO2 30 06/29/2017 05:23 AM    BUN 41 06/29/2017 05:23 AM    Creatinine 1.81 06/29/2017 05:23 AM    Glucose 205 06/29/2017 05:23 AM    Calcium 8.7 06/29/2017 05:23 AM    Magnesium 2.3 06/29/2017 05:23 AM    Phosphorus 3.7 06/29/2017 05:23 AM       Lab Results   Component Value Date/Time    WBC 14.4 06/29/2017 05:23 AM    HGB 9.2 06/29/2017 05:23 AM    PLATELET 880 84/51/9399 05:23 AM    MCV 97.6 06/29/2017 05:23 AM       Lab Results   Component Value Date/Time    INR 1.0 06/27/2017 08:56 PM    aPTT 23.6 06/27/2017 08:56 PM    AST (SGOT) 15 06/28/2017 04:29 AM    Alk.  phosphatase 75 06/28/2017 04:29 AM    Protein, total 7.3 06/28/2017 04:29 AM    Albumin 3.0 06/28/2017 04:29 AM    Globulin 4.3 06/28/2017 04:29 AM       No results found for: IRON, FE, TIBC, IBCT, PSAT, FERR    No results found for: SR, CRP, RAFAEL, ANAIGG, RA, RPR, RPRT, VDRLT, VDRLS, TSH, TSHEXT, TSHEXT     No results found for: PH, PHI, PCO2, PCO2I, PO2, PO2I, HCO3, HCO3I, FIO2, FIO2I    Lab Results   Component Value Date/Time    Troponin-I, Qt. <0.04 06/27/2017 08:56 PM        Lab Results   Component Value Date/Time    Culture result: GRAM NEGATIVE RODS 06/28/2017 04:27 AM       No results found for: TOXA1, RPR, HBCM, HBSAG, HAAB, HCAB1, HAAT, G6PD, CRYAC, HIVGT, HIVR, HIV1, HIV12, HIVPC, HIVRPI    No results found for: VANCT, CPK    Lab Results   Component Value Date/Time    Color YELLOW/STRAW 06/28/2017 04:27 AM    Appearance CLOUDY 06/28/2017 04:27 AM    pH (UA) 5.5 06/28/2017 04:27 AM    Protein TRACE 06/28/2017 04:27 AM    Glucose NEGATIVE  06/28/2017 04:27 AM    Ketone NEGATIVE  06/28/2017 04:27 AM    Bilirubin NEGATIVE  06/28/2017 04:27 AM    Blood NEGATIVE  06/28/2017 04:27 AM    Urobilinogen 0.2 06/28/2017 04:27 AM    Nitrites NEGATIVE  06/28/2017 04:27 AM    Leukocyte Esterase MODERATE 06/28/2017 04:27 AM    WBC >100 06/28/2017 04:27 AM    RBC 0-5 06/28/2017 04:27 AM    Bacteria 4+ 06/28/2017 04:27 AM       Images: no new images this morning    IMPRESSION  · COPD exacerbation  · Chronic hypoxic respiratory failure  · CO2 retention  · SILVIA  · UTI  · RLE cellulitis  · CHF  · CAD  · DM  · ALLY  · RLS  · GERD  · Tobacco use    PLAN  · Continue O2 to keep sats > 90%; baseline is 3L during the day and 4L bled into BiPAP nightly  · Continue BiPAP at home settings of 18/9  · D/C Duonebs due to significant cough  · Can switch Solumedrol to 12 day prednisone taper   · Continue Doxy and cefepime per primary team; f/u cultures  · Continue diuresis with Bumex; Renal following  · Home regimen for RLS: Mirapex, Requip, and Baclofen  · Requested records from [facility] pulmonary and recent hospitalization  · Counseled on smoking cessation  · GI prophylaxis: Protonix  · DVT prophylaxis: sub q heparin      Patient is stable from a pulmonary standpoint. We will sign off and arrange for outpatient follow up in 2-4 weeks. Please call with questions.     Gibson Monaco

## 2017-06-29 NOTE — PROCEDURES
[address]  *** FINAL REPORT ***    Name: Yeni Zaldivar  MRN: [id number]    Inpatient  : 25 Aug 1960  HIS Order #: [id number]  [id number] [facility] Visit #: [id number]  Date: 2017    TYPE OF TEST: Peripheral Venous Testing    REASON FOR TEST  Pain in limb, Limb swelling, Shortness of breath    Right Leg:-  Deep venous thrombosis:           No  Superficial venous thrombosis:    No  Deep venous insufficiency:        No  Superficial venous insufficiency: No    Left Leg:-  Deep venous thrombosis:           No  Superficial venous thrombosis:    No  Deep venous insufficiency:        No  Superficial venous insufficiency: No      INTERPRETATION/FINDINGS  PROCEDURE:  BILATERAL LE VENOUS DUPLEX. Evaluation of lower extremity veins with ultrasound (B-mode imaging,  pulsed Doppler, color Doppler). Includes the common femoral, deep  femoral, femoral, popliteal, posterior tibial, peroneal, and great  saphenous veins. FINDINGS: Technically difficult exam due to patient body habitus. Gray   scale imaging suboptimal. Gray scale and color flow duplex images of  the veins in both lower extremities demonstrate normal  compressibility, spontaneous and augmented flow profiles, and absence  of filling defects throughout the deep and superficial veins in both  lower extremities. CONCLUSION:  Bilateral lower extremity venous duplex negative for deep   venous thrombosis or thrombophlebitis. ADDITIONAL COMMENTS    I have personally reviewed the data relevant to the interpretation of  this  study. TECHNOLOGIST: Mariely Portillo. Naveen  Signed: 2017 8:56:05 PM    PHYSICIAN: Jed Baron.  Claude Villar MD  Signed: 2017 8:47:57 AM

## 2017-06-29 NOTE — PROGRESS NOTES
Bedside and Verbal shift change report given to [address] Shravan Beck (oncoming nurse) by Walker Primrose* (offgoing nurse). Report included the following information SBAR, Kardex, Intake/Output, MAR and Recent Results.

## 2017-06-29 NOTE — DISCHARGE SUMMARY
[address]  [address] [facility] [place], [address] [facility]  [phone]    Physician Discharge Summary     Patient ID:  Pinky Damon  [id number]  60 y.o.  1960    Admit date: 6/27/2017    Discharge date and time: 6/29/2017    Admission Diagnoses: Acute on chronic respiratory failure with hypoxemia [facility])    Discharge Diagnoses:  Principal Diagnosis Acute on chronic respiratory failure with hypoxemia [facility])                                            Principal Problem:    Acute on chronic respiratory failure with hypoxemia ([facility] [address] (6/27/2017)    Active Problems:    CHF (congestive heart failure) ([facility] [address] (6/27/2017)      SILVIA (acute kidney injury) ([facility] [address] (6/27/2017)      Type II diabetes mellitus ([facility] [address] (6/27/2017)      Chronic obstructive pulmonary disease ([facility] [address] (6/28/2017)      ALLY treated with BiPAP (6/28/2017)      Morbid obesity with BMI of 50.0-59.9, adult ([facility] [address] (6/28/2017)      Cellulitis (6/28/2017)      UTI (urinary tract infection) (6/29/2017)           Hospital Course:     59 yo hx of HTN, DM, COPD on 3L O2, dCHF, CKD 3, presented w/ dyspnea, COPD, SILVIA     1) Acute on chronic respiratory failure with hypoxemia/COPD exacerbation: on chronic 3L O2. No evidence of CHF. Patient developed cough to Breo and duo nebs. Pulm recommended prednisone taper, O2. Patient will f/u with Pulm at [facility] (Dr. Raman Louis)     2) SILVIA/CKD 3: could be volume depletion from diuretics. Improving on discharge. Bumex decreased. Renal was following     3) UTI: recurrent. Urine Cx with gram neg rods. Patient tolerated IV Cefepime. Will complete a course of omnicef     4) RLE cellulitis: mild. finish short course of Doxy     5) CAD/dCHF: volume stable. Cont plavix, BB, statin.   Bumex decreased due to SILVIA     6) DM type 2 w/ renal complications: C7D 3.1%, cont diet control     7) ALLY: cont CPAP     8) Morbid obesity: counseled on wt loss    PCP: Serene Schirmer, MD     Consults: Pulm, Renal    Significant Diagnostic Studies: none    Discharge Exam:  Physical Exam:    Gen:  Disheveled, chronically ill appearing, morbidly obese, NAD  HEENT:  Pink conjunctivae, PERRL, hearing intact to voice, moist mucous membranes  Neck:  Supple, without masses, thyroid non-tender  Resp:  No accessory muscle use, prolonged expiration  Card:  No murmurs, normal S1, S2 without thrills, 1+ edema  Abd:  Soft, non-tender, non-distended, normoactive bowel sounds are present  Musc:  No cyanosis or clubbing  Skin:  Small area of erythema on right shin  Neuro:  Cranial nerves 3-12 are grossly intact, follows commands appropriately  Psych:  Good insight, oriented to person, place and time, alert    Disposition: home  Discharge Condition: Stable    Patient Instructions:   Current Discharge Medication List      START taking these medications    Details   doxycycline (VIBRA-TABS) 100 mg tablet Take 1 Tab by mouth every twelve (12) hours for 5 days. Qty: 10 Tab, Refills: 0      L. acidoph & paracasei- S therm- Bifido (ROE-Q/RISAQUAD) 8 billion cell cap cap Take 1 Cap by mouth daily. Qty: 30 Cap, Refills: 1      cefdinir (OMNICEF) 300 mg capsule Take 1 Cap by mouth two (2) times a day for 5 days. Qty: 10 Cap, Refills: 0      predniSONE (DELTASONE) 10 mg tablet Take 40mg (4 tabs) daily for 2 days, then 20mg (2 tabs) daily for 2 days, then 10mg daily until you see your pulmonologist at [facility]  Qty: 30 Tab, Refills: 0         CONTINUE these medications which have CHANGED    Details   bumetanide (BUMEX) 2 mg tablet Take 1 Tab by mouth two (2) times a day. Qty: 60 Tab, Refills: 0         CONTINUE these medications which have NOT CHANGED    Details   baclofen (LIORESAL) 10 mg tablet Take 10 mg by mouth two (2) times a day. cholecalciferol (VITAMIN D3) 1,000 unit tablet Take 1,000 Units by mouth daily. clopidogrel (PLAVIX) 75 mg tab Take 75 mg by mouth nightly.       docusate sodium (COLACE) 100 mg capsule Take 100 mg by mouth two (2) times a day. DULoxetine (CYMBALTA) 60 mg capsule Take 60 mg by mouth nightly. ferrous sulfate 325 mg (65 mg iron) tablet Take 325 mg by mouth Daily (before breakfast). gabapentin (NEURONTIN) 300 mg capsule Take 600 mg by mouth nightly. melatonin tab tablet Take 10 mg by mouth nightly. oxybutynin chloride XL (DITROPAN XL) 10 mg CR tablet Take 10 mg by mouth nightly. pantoprazole (PROTONIX) 40 mg tablet Take 40 mg by mouth nightly. potassium chloride (K-DUR, KLOR-CON) 20 mEq tablet Take 20 mEq by mouth daily. pramipexole (MIRAPEX) 0.5 mg tablet Take 0.5 mg by mouth nightly. pravastatin (PRAVACHOL) 40 mg tablet Take 40 mg by mouth nightly. ranolazine ER (RANEXA) 500 mg SR tablet Take 1,000 mg by mouth two (2) times a day. !! rOPINIRole (REQUIP) 0.25 mg tablet Take 0.25 mg by mouth daily as needed. In addition to one tablet nightly      spironolactone (ALDACTONE) 50 mg tablet Take 50 mg by mouth every Monday, Wednesday, Friday. traMADol (ULTRAM) 50 mg tablet Take 100 mg by mouth nightly. metoprolol tartrate (LOPRESSOR) 25 mg tablet Take 12.5 mg by mouth two (2) times a day. !! rOPINIRole (REQUIP) 0.25 mg tablet Take 0.25 mg by mouth nightly. busPIRone (BUSPAR) 5 mg tablet Take 5 mg by mouth three (3) times daily as needed. calcium carbonate-vitamin d2 500 mg(1,250mg) -200 unit tab Take 1 Tab by mouth two (2) times a day. !! - Potential duplicate medications found. Please discuss with provider.         Activity: Activity as tolerated  Diet: Diabetic Diet  Wound Care: None needed    Follow-up with  Follow-up Information     Follow up With Details Comments [address], [place] Schedule an appointment as soon as possible for a visit in 1 week  [facility] [address] [facility] Karyna Cortez MD Schedule an appointment as soon as possible for a visit in 1 week  [address] [facility] [place]  [phone]            Follow-up tests/labs none    Signed:  Jose Alfredo Walls MD  6/29/2017  11:37 AM    I spent 35 min on discharge

## 2017-06-30 ENCOUNTER — HOME CARE VISIT (OUTPATIENT)
Dept: HOME HEALTH SERVICES | Facility: HOME HEALTH | Age: 57
End: 2017-06-30

## 2017-06-30 LAB
BACTERIA SPEC CULT: ABNORMAL
CC UR VC: ABNORMAL
SERVICE CMNT-IMP: ABNORMAL

## 2017-07-05 ENCOUNTER — HOME CARE VISIT (OUTPATIENT)
Dept: HOME HEALTH SERVICES | Facility: HOME HEALTH | Age: 57
End: 2017-07-05

## 2017-07-06 ENCOUNTER — HOME CARE VISIT (OUTPATIENT)
Dept: SCHEDULING | Facility: HOME HEALTH | Age: 57
End: 2017-07-06

## 2017-07-06 PROCEDURE — G0299 HHS/HOSPICE OF RN EA 15 MIN: HCPCS
